# Patient Record
Sex: FEMALE | Race: WHITE | HISPANIC OR LATINO | Employment: FULL TIME | URBAN - METROPOLITAN AREA
[De-identification: names, ages, dates, MRNs, and addresses within clinical notes are randomized per-mention and may not be internally consistent; named-entity substitution may affect disease eponyms.]

---

## 2023-07-24 ENCOUNTER — TELEPHONE (OUTPATIENT)
Dept: PRIMARY CARE | Facility: CLINIC | Age: 58
End: 2023-07-24
Payer: COMMERCIAL

## 2023-07-24 NOTE — TELEPHONE ENCOUNTER
Talked with pt -  Covid + and symptoms  for 4 day   On the imuran and just started the paxlovid   Anemia since on it   Has helped her rheumatoid   Will hold the imuran and restart in 10 days   Will need to rest and hydration

## 2023-07-24 NOTE — TELEPHONE ENCOUNTER
Patient seen in ED yesterday and was advised that she has Covid - they prescribed Paxlovid which she is picking up today.    States she takes medication is taking medication for her rheumatoid issues and wants to know if she should continue taking it while she has COVID.     States she believes that this medication caused her to get covid because it lowers her immune system.     Would like to discuss with you    Please advise

## 2023-10-27 PROBLEM — K64.9 HEMORRHOIDS: Status: ACTIVE | Noted: 2023-10-27

## 2023-10-27 PROBLEM — F32.A DEPRESSION: Status: ACTIVE | Noted: 2023-10-27

## 2023-10-27 PROBLEM — M48.00 SPINAL STENOSIS: Status: ACTIVE | Noted: 2023-10-27

## 2023-10-27 PROBLEM — N32.81 OAB (OVERACTIVE BLADDER): Status: ACTIVE | Noted: 2023-10-27

## 2023-10-27 PROBLEM — M50.90 DISC DISORDER OF CERVICAL REGION: Status: ACTIVE | Noted: 2023-10-27

## 2023-10-27 PROBLEM — R53.83 MALAISE AND FATIGUE: Status: ACTIVE | Noted: 2023-10-27

## 2023-10-27 PROBLEM — R13.10 DYSPHAGIA: Status: ACTIVE | Noted: 2023-10-27

## 2023-10-27 PROBLEM — N91.1 AMENORRHEA, SECONDARY: Status: ACTIVE | Noted: 2023-10-27

## 2023-10-27 PROBLEM — R33.9 INCOMPLETE EMPTYING OF BLADDER: Status: ACTIVE | Noted: 2023-10-27

## 2023-10-27 PROBLEM — K31.84 GASTROPARESIS: Status: ACTIVE | Noted: 2023-10-27

## 2023-10-27 PROBLEM — R53.81 MALAISE AND FATIGUE: Status: ACTIVE | Noted: 2023-10-27

## 2023-10-27 PROBLEM — K44.9 HIATAL HERNIA: Status: ACTIVE | Noted: 2023-10-27

## 2023-10-27 PROBLEM — D13.91 ADENOMATOUS POLYPOSIS COLI: Status: ACTIVE | Noted: 2023-10-27

## 2023-10-27 PROBLEM — M81.0 OSTEOPOROSIS: Status: ACTIVE | Noted: 2023-10-27

## 2023-10-27 PROBLEM — K58.9 IRRITABLE BOWEL SYNDROME: Status: ACTIVE | Noted: 2023-10-27

## 2023-10-27 PROBLEM — R63.5 ABNORMAL WEIGHT GAIN: Status: ACTIVE | Noted: 2023-10-27

## 2023-10-27 PROBLEM — K21.9 ESOPHAGEAL REFLUX: Status: ACTIVE | Noted: 2023-10-27

## 2023-10-27 PROBLEM — R32 BLADDER INCONTINENCE: Status: ACTIVE | Noted: 2023-10-27

## 2023-10-27 PROBLEM — K59.09 CHRONIC CONSTIPATION: Status: ACTIVE | Noted: 2023-10-27

## 2023-10-27 PROBLEM — R10.2 FEMALE PELVIC PAIN: Status: ACTIVE | Noted: 2023-10-27

## 2023-10-27 PROBLEM — N95.9 MENOPAUSAL DISORDER: Status: ACTIVE | Noted: 2023-10-27

## 2023-10-27 PROBLEM — M79.2 RADICULAR PAIN IN LEFT ARM: Status: ACTIVE | Noted: 2023-10-27

## 2023-10-27 PROBLEM — R10.9 ABDOMINAL PAIN: Status: ACTIVE | Noted: 2023-10-27

## 2023-10-27 PROBLEM — R10.32 LEFT LOWER QUADRANT PAIN: Status: ACTIVE | Noted: 2023-10-27

## 2023-10-27 PROBLEM — F17.200 TOBACCO DEPENDENCE: Status: ACTIVE | Noted: 2023-10-27

## 2023-10-27 PROBLEM — N95.1 MENOPAUSAL SYMPTOMS: Status: ACTIVE | Noted: 2023-10-27

## 2023-10-27 PROBLEM — M06.9 RHEUMATOID ARTHRITIS (MULTI): Status: ACTIVE | Noted: 2023-10-27

## 2023-10-27 PROBLEM — M35.00 SJOGRENS SYNDROME (MULTI): Status: ACTIVE | Noted: 2023-10-27

## 2023-10-27 PROBLEM — T75.3XXA MOTION SICKNESS: Status: ACTIVE | Noted: 2023-10-27

## 2023-10-27 PROBLEM — M79.7 FIBROMYALGIA: Status: ACTIVE | Noted: 2023-10-27

## 2023-10-27 PROBLEM — N39.46 MIXED STRESS AND URGE URINARY INCONTINENCE: Status: ACTIVE | Noted: 2023-10-27

## 2023-10-27 RX ORDER — AZELASTINE 1 MG/ML
SPRAY, METERED NASAL
COMMUNITY
Start: 2020-10-01

## 2023-10-27 RX ORDER — NARATRIPTAN 2.5 MG/1
2.5 TABLET ORAL
COMMUNITY
Start: 2017-02-06

## 2023-10-27 RX ORDER — LEUCOVORIN CALCIUM 5 MG/1
TABLET ORAL
COMMUNITY
Start: 2022-08-07 | End: 2024-02-08 | Stop reason: ALTCHOICE

## 2023-10-27 RX ORDER — DEXTROAMPHETAMINE SACCHARATE, AMPHETAMINE ASPARTATE, DEXTROAMPHETAMINE SULFATE AND AMPHETAMINE SULFATE 3.75; 3.75; 3.75; 3.75 MG/1; MG/1; MG/1; MG/1
TABLET ORAL
COMMUNITY
Start: 2023-04-05

## 2023-10-27 RX ORDER — TRAZODONE HYDROCHLORIDE 100 MG/1
100 TABLET ORAL NIGHTLY
COMMUNITY
End: 2024-04-16 | Stop reason: WASHOUT

## 2023-10-27 RX ORDER — FLUTICASONE PROPIONATE 50 MCG
2 SPRAY, SUSPENSION (ML) NASAL DAILY
COMMUNITY
Start: 2022-09-09

## 2023-10-27 RX ORDER — CYCLOSPORINE 0 G/ML
SOLUTION/ DROPS OPHTHALMIC; TOPICAL
COMMUNITY
Start: 2021-05-21 | End: 2024-04-16 | Stop reason: WASHOUT

## 2023-10-27 RX ORDER — DICLOFENAC EPOLAMINE 0.01 G/1
SYSTEM TOPICAL AS NEEDED
COMMUNITY
End: 2024-02-08 | Stop reason: ALTCHOICE

## 2023-10-27 RX ORDER — LIFITEGRAST 50 MG/ML
1 SOLUTION/ DROPS OPHTHALMIC 2 TIMES DAILY
COMMUNITY
End: 2024-02-29 | Stop reason: ALTCHOICE

## 2023-10-27 RX ORDER — AZATHIOPRINE 50 MG/1
50 TABLET ORAL 2 TIMES DAILY
COMMUNITY

## 2023-10-27 RX ORDER — TROSPIUM CHLORIDE 20 MG/1
1 TABLET, FILM COATED ORAL 2 TIMES DAILY
COMMUNITY
Start: 2023-01-26 | End: 2023-10-30 | Stop reason: ALTCHOICE

## 2023-10-27 RX ORDER — DEXLANSOPRAZOLE 60 MG/1
60 CAPSULE, DELAYED RELEASE ORAL
COMMUNITY
Start: 2021-05-21 | End: 2023-10-30

## 2023-10-27 RX ORDER — POLYETHYLENE GLYCOL 3350 17 G/17G
17 POWDER, FOR SOLUTION ORAL
COMMUNITY
Start: 2019-03-26 | End: 2024-04-16 | Stop reason: WASHOUT

## 2023-10-27 RX ORDER — ALBUTEROL SULFATE 90 UG/1
1-2 AEROSOL, METERED RESPIRATORY (INHALATION) AS NEEDED
COMMUNITY
Start: 2021-12-06 | End: 2024-02-29 | Stop reason: ALTCHOICE

## 2023-10-27 RX ORDER — SODIUM FLUORIDE 5 MG/G
GEL, DENTIFRICE DENTAL
COMMUNITY
Start: 2021-12-27 | End: 2024-04-16 | Stop reason: WASHOUT

## 2023-10-27 RX ORDER — TRAMADOL HYDROCHLORIDE 50 MG/1
TABLET ORAL
COMMUNITY
Start: 2012-12-12

## 2023-10-27 RX ORDER — CYCLOSPORINE 0.5 MG/ML
1 EMULSION OPHTHALMIC EVERY 12 HOURS
COMMUNITY
End: 2024-02-08 | Stop reason: ALTCHOICE

## 2023-10-27 RX ORDER — MINOXIDIL 2.5 MG/1
0.5 TABLET ORAL DAILY
COMMUNITY
End: 2024-02-29 | Stop reason: ALTCHOICE

## 2023-10-27 RX ORDER — KETOCONAZOLE 20 MG/ML
SHAMPOO, SUSPENSION TOPICAL
COMMUNITY
Start: 2020-12-10

## 2023-10-27 RX ORDER — CARISOPRODOL 350 MG/1
TABLET ORAL 3 TIMES DAILY
COMMUNITY
End: 2024-02-08 | Stop reason: ALTCHOICE

## 2023-10-27 RX ORDER — FINASTERIDE 5 MG/1
5 TABLET, FILM COATED ORAL DAILY
COMMUNITY
Start: 2023-03-19 | End: 2024-02-29 | Stop reason: ALTCHOICE

## 2023-10-27 RX ORDER — GABAPENTIN 300 MG/1
600 CAPSULE ORAL EVERY 8 HOURS
COMMUNITY

## 2023-10-27 RX ORDER — DICLOFENAC SODIUM 75 MG/1
TABLET, DELAYED RELEASE ORAL
COMMUNITY
Start: 2017-03-06 | End: 2024-02-08 | Stop reason: ALTCHOICE

## 2023-10-27 RX ORDER — FREMANEZUMAB-VFRM 225 MG/1.5ML
INJECTION SUBCUTANEOUS
COMMUNITY
Start: 2021-03-25

## 2023-10-27 RX ORDER — PILOCARPINE HYDROCHLORIDE 7.5 MG/1
7.5 TABLET, FILM COATED ORAL 4 TIMES DAILY
COMMUNITY

## 2023-10-27 RX ORDER — MIRABEGRON 25 MG/1
1 TABLET, FILM COATED, EXTENDED RELEASE ORAL DAILY
COMMUNITY
Start: 2023-01-06 | End: 2023-10-30

## 2023-10-27 RX ORDER — DEXTROAMPHETAMINE SACCHARATE, AMPHETAMINE ASPARTATE, DEXTROAMPHETAMINE SULFATE AND AMPHETAMINE SULFATE 5; 5; 5; 5 MG/1; MG/1; MG/1; MG/1
TABLET ORAL
COMMUNITY
Start: 2022-07-10 | End: 2023-10-30 | Stop reason: ALTCHOICE

## 2023-10-27 RX ORDER — OMEPRAZOLE 40 MG/1
40 CAPSULE, DELAYED RELEASE ORAL DAILY
COMMUNITY
End: 2023-10-30

## 2023-10-27 RX ORDER — METOCLOPRAMIDE 5 MG/1
TABLET ORAL
COMMUNITY
Start: 2021-11-21 | End: 2023-10-30

## 2023-10-27 RX ORDER — DEXTROAMPHETAMINE SACCHARATE, AMPHETAMINE ASPARTATE MONOHYDRATE, DEXTROAMPHETAMINE SULFATE AND AMPHETAMINE SULFATE 2.5; 2.5; 2.5; 2.5 MG/1; MG/1; MG/1; MG/1
CAPSULE, EXTENDED RELEASE ORAL
COMMUNITY
Start: 2018-12-05 | End: 2023-10-30 | Stop reason: ALTCHOICE

## 2023-10-27 RX ORDER — FAMOTIDINE 40 MG/1
1 TABLET, FILM COATED ORAL 2 TIMES DAILY
COMMUNITY
Start: 2022-12-13

## 2023-10-27 RX ORDER — SUCRALFATE 1 G/1
1 TABLET ORAL
COMMUNITY
Start: 2021-01-24 | End: 2023-10-30

## 2023-10-27 RX ORDER — VIBEGRON 75 MG/1
1 TABLET, FILM COATED ORAL DAILY
COMMUNITY
Start: 2023-01-23 | End: 2023-10-30 | Stop reason: ALTCHOICE

## 2023-10-27 RX ORDER — BROMPHENIRAMINE MALEATE, PSEUDOEPHEDRINE HYDROCHLORIDE, AND DEXTROMETHORPHAN HYDROBROMIDE 2; 30; 10 MG/5ML; MG/5ML; MG/5ML
SYRUP ORAL
COMMUNITY
Start: 2022-08-31 | End: 2024-02-08 | Stop reason: ALTCHOICE

## 2023-10-27 RX ORDER — HYDROXYCHLOROQUINE SULFATE 200 MG/1
1 TABLET, FILM COATED ORAL DAILY
COMMUNITY
Start: 2018-12-05

## 2023-10-27 RX ORDER — CEFUROXIME AXETIL 250 MG/1
TABLET ORAL
COMMUNITY
Start: 2023-07-23 | End: 2023-10-30 | Stop reason: ALTCHOICE

## 2023-10-27 RX ORDER — ERGOCALCIFEROL 1.25 MG/1
CAPSULE ORAL
COMMUNITY
Start: 2020-09-22

## 2023-10-27 RX ORDER — METHOTREXATE 2.5 MG/1
6 TABLET ORAL
COMMUNITY
Start: 2022-12-27 | End: 2023-10-30

## 2023-10-27 RX ORDER — SERTRALINE HYDROCHLORIDE 100 MG/1
TABLET, FILM COATED ORAL
COMMUNITY
Start: 2022-09-17 | End: 2023-10-30

## 2023-10-27 RX ORDER — NAPROXEN 500 MG/1
500 TABLET ORAL 2 TIMES DAILY PRN
COMMUNITY
End: 2024-02-29 | Stop reason: ALTCHOICE

## 2023-10-29 NOTE — PROGRESS NOTES
Subjective   Patient ID: Gema High is a 58 y.o. female who presents for her annual physical and evaluation for abdominal pain and bloating       She is vaping instead of smoking.    She has gained weight. She does not eat any more than usual.  She is not exercising   She is thinking about trying the injectable weight loss medications   She has joint pains with the increased weight.    She complains of abdominal pain and bloating   When she sits her pants are uncomfortable around her abdomen  She has an appt with GI in 11/2023  She has been doing the FODMAP diet for 10 years     She has little control of her bladder     She has an appt with Gyn in 2/2024    She complains of being fatigued. Her  has a hard time waking her up from sleep.    She just started Pristiq a week ago. She has nausea with Pristiq   She complains of foul odor to her urine     She is compliant with her systemic medications     HEALTH:  PAP 2/15 - Q 5 with Dr Wei --> she declines Pap 10/2022.    Mammo 2/17 -, 2/19-, 3/2022 at [facility] was focal asymmetry right breast and follow up was negative, ordered 10/2023  BD 6/2022 -2.2 L/S and hip -2.0 at [facility]  COLON 2015, 4/2019 showed hyperplastic polyp, 6/2022 and Q 5   EKG 12/09, 12/18, 3/2022  Ca cardiac score 2/19 Zero   Urine 12/18   Flu 2012, 10/2021, 12/2022, 10/2023 and yearly at work  TDAP 2004, 12/18  Prevnar never  Pneumovax never   Zostavax never   Shingrix recommended and will check coverage   Pfizer CVD vaccine 3/26/2021 and 4/16/2021 booster 10/8/2022 and 1/9/2022   Opth- She sees regularly for dry eyes/ Sjogren's. No glaucoma, MD or HCQ toxicity   She is using Restasis OU BID and Cequa drops        Review of Systems  All systems negative except those listed in the HPI      Past Medical, Surgical, and Family History reviewed and updated in chart.  Reviewed all medications by prescribing practitioner or clinical pharmacist   (such as prescriptions, OTCs, herbal therapies and  supplements) and documented in the medical record      Objective   Visit Vitals  /78   Pulse 86   Temp 36.5 °C (97.7 °F)    Body mass index is 30.36 kg/m².     Physical Exam  Vitals reviewed.   Constitutional:       Appearance: Normal appearance.   HENT:      Head: Normocephalic.      Right Ear: Tympanic membrane, ear canal and external ear normal.      Left Ear: Tympanic membrane, ear canal and external ear normal.      Nose: Nose normal.      Mouth/Throat:      Pharynx: Oropharynx is clear.   Eyes:      Conjunctiva/sclera: Conjunctivae normal.   Cardiovascular:      Rate and Rhythm: Normal rate and regular rhythm.      Pulses: Normal pulses.      Heart sounds: Normal heart sounds.   Pulmonary:      Effort: Pulmonary effort is normal.      Breath sounds: Normal breath sounds.   Abdominal:      General: Bowel sounds are normal.      Palpations: Abdomen is soft.   Musculoskeletal:         General: Normal range of motion.      Cervical back: Normal range of motion and neck supple.      Comments: she has swan deformity at base of thumb at surgical site   tenderness to the shoulders, wrists, knees and ankles    Skin:     General: Skin is warm.   Neurological:      General: No focal deficit present.      Mental Status: She is alert and oriented to person, place, and time.   Psychiatric:         Mood and Affect: Mood normal.         Behavior: Behavior normal.         Thought Content: Thought content normal.         Judgment: Judgment normal.       Assessment/Plan   Problem List Items Addressed This Visit       Abdominal pain    Chronic constipation    Depression    Fibromyalgia    Gastroparesis    Malaise and fatigue    Osteoporosis    Rheumatoid arthritis (CMS/HCC)    Sjogrens syndrome (CMS/HCC)    Spinal stenosis    Tobacco dependence     Other Visit Diagnoses       Healthcare maintenance    -  Primary    Relevant Orders    CBC and Auto Differential    Comprehensive Metabolic Panel    Lipid Panel    TSH with  reflex to Free T4 if abnormal    Hemoglobin A1C    Weight gain        Visit for screening mammogram        Relevant Orders    BI mammo bilateral screening tomosynthesis            Annual physical completed  Annual labs ordered   Medication reconciliation performed with patient     Health Maintenence completed  -  Discussed healthy diet and regular exercise.    -  Physical exam overall unremarkable. Immunizations reviewed and updated accordingly. Healthy lifestyle choices discussed (tobacco avoidance, appropriate alcohol use, avoidance of illicit substances).   -  Patient is wearing seatbelt.   -  Screening lab work ordered as indicated.    -  Age appropriate screening tests reviewed with patient.       Seen in the ED in 7/2023 for COVID: Resolved and no residual Sx   Rx given for Ceftin 250 mg and local care discussed     RUL pneumonia 9/2022: Treated and resolved    CXR 9/9/2022 was negative for acute process   CXR 9/27/2022 RUL infiltrate with small effusion      She stopped smoking in 1/2023. She is vaping nicotine.   She started smoking at age 15. Her father smoked also.   Nicotine dependence I spent more than 3 minutes counseling patient about the need for smoking cessation and how I can support efforts when patient is ready to quit. Discussed nicotine replacement therapy, Varenicline, Bupropion, hypnosis, support groups and acupuncture as potential options. Patient currently has no signs or symptoms of tobacco related disease.  (Dx code F17.2 or Z87.891)(Billing code 89215 or 89113)      Her weight is 171 pounds with BMI at 30.36. We spent 15 minutes discussing diet and exercise. The struggle of weight loss persists   She has gained 30 pounds since 3/2023  She has been doing the FODMAP diet for 10 years    Discussed systemic medications that could be contributing to her weight gain   She has been getting steroid injections to her foot and that is contributing to her weight gain.      BP in normal range in  office. We will continue to monitor   EKG was normal 3/2022 at  preop hand surgery   Recommend she monitor her BP periodically and call with elevated readings      I have spent 15 min face to face with this patient discussing their cardiac risk and behavioral therapies of nutrition choices and exercise. We are trying to eliminate habits that are contributing to their cardiac risk.  We agreed on a plan of how they can reduce their current CV risk   The patient's 10 yr CV risk was estimated at 3.4 % 10/2022      Elevated lipids: Labs ordered and we will adjust if indicated  10/2023  Explained goal for LDL to be less than 100 and ideally less than 70   CT Cardiac Score 2/19 Zero   Discussed diet and exercise to better control lipids      Thyroid Bx in 9/2019 showed benign nodule: Labs ordered and we will adjust if indicated 10/2023  Thyroid Bx 2022 negative   She is following with Dr Sousa in endocrinology      Migraines -   Continue Tramadol 50 mg daily and pilocarpine 7.5 mg daily   Continue naratriptan 2.5 mg prn onset of HA and Ajovy as directed      Right submandibular gland tenderness radiating to the right ear -   The Sjogren dries the nasal passages a lot and they are raw.   Has been evaluated by allergist with all the left side face issues and nothing found     GERD -   She has a history of HH  Continue Pepcid 40 mg daily   She had no improvement with Dexilant   Avoid Reglan while taking Pristiq   She has been on Carafate and Prilosec in the past   Xray Upper GI 6/2021 showed mild to moderate tertiary contractions in the mid to distal esophagus resulting in moderate gastroesophageal reflux. Delay in passage of contrast stomach duodenum, may represent sequela of gastro paresis     Depression is all from her stressors and pain: Recommend she talk with psych about increasing the Adderall to 20 mg daily to counteract her weight gain  Continue Adderall 15 mg daily, Trazodone 100 mg nightly and Pristiq 25 mg  daily   She is seeing psych and they are doing refills, contracts and drug screen.   Discussed food satiation with SSRIs. She will be mindful of this     OAB:  Previously failed Trospium due to experiencing adverse side effects and both Gemtesa and Myrbetriq were cost-prohibitive.   - Cystoscopy + intradetrusor Botox injection of 100 U @ 4 sites 5/2023. Tolerated the procedure well with normal cystoscopy findings: Normal bladder mucosa. No stones, masses, or tumors seen.   She saw Dr. Viktoriya Fuentes in 5/2023      IBS and Constipation - She complains of abdominal pain and bloating. When she sits her pants are uncomfortable around her abdomen. Explained the weight gain is contributing to her abdominal discomfort. She will talk with GI about using Wegovy.  She is doing a FODMAP diet   Hemorrhoids were removed with Dr Toney nonsurgically.  Continue Linzess 145 mg a day. This helps the constipation but not the bloating   Colon 6/2022 and Q 5   She has an appt with GI in 11/2023      Seronegative RA and Sjogrens - tenderness to the shoulders, wrists, knees and ankles 10/2023  She restarted MTX in 2022 due to her arthritis being so bad   The main cause of her fatigue, muscle pain, GI issues and insomnia is Fibromyalgia   She takes trazodone for the sleep issues.   Coldness and tingling of hands - Worse right hand. This could be nerve impingement.   Continue Imuran 50 mg BID   Continue  mg daily and pilocarpine 7.5 mg daily  Continue gabapentin 600 mg 2 tablets Q 8 hours   She is following with [facility]ology at [facility]   Recommend she look into somatic tracking      Cervical disc d/o:  She failed pain management and the EMG do not show any radiculopathy  No surgery for her hands despite the numbness, pain and coldness   some atrophy right thenar region so concerned there is some innervation in the hand      Mild spinal scoliosis -  She sees pain Management and plans to get nerve blocks soon   She had C spine fusion  and some severe nerve pain in her neck and jaw and the X-rays all negative.     Rotary cuff tear surgery 11/8/17. She still has a lot of pain in the left shoulder.   This affects the numbness and pain down the left arm when working      Bunion on right foot and arthritis outer left foot   Avoid walking barefooted and wear good shoes   She is following with podiatry and last visit 10/2023     She had thumb replacement right hand in 3/2022 : she has swan deformity at base of thumb at surgical site   She is going to do the left thumb in 11/2022      PAP 2/15 - and Q 5 with Dr. Wei. She saw Dr Wei in 11/2019. she declines Pap 10/2022. She has an appt with Gyn in 2/2024   She saw Dr Farr in 2021 but did not have a Pap    FSH 44.4 on labs in 7/2022  US of pelvis in 3/2020 showed small uterine fibroid, thickened endometrium   Pelvic wall therapy was helpful.   Mammo 3/2022 at [facility] was focal asymmetry right breast and follow up was negative.  Orders placed for mammogram 10/2023  Breast exam normal 10/2023    BD 6/2022 -2.2 L/S and hip -2.0 at [facility]. Continue OTC Calcium 500 mg BID, OTC Vitamin D 2000 UT daily and eat 2 servings of calcium enriched foods daily. Discussed importance of weight bearing exercises   Colonoscopy 6/2022 and Q 5      Ophth  She sees regularly for dry eyes/ Sjogren's. No glaucoma, MD or HCQ toxicity. She is using Restasis OU BID and Cequa drops   She will have her next eye exam faxed to my office in order to update her medical records.      Flu 2012, 10/2021, 12/2022, 10/2023 yearly at work  TDAP 2004, 12/18  Prevnar never  Pneumovax never   Zostavax never   Shingrix recommended and will check coverage   Pfizer CVD 3/26/2021 and 4/16/2021 booster 10/8/2022 and 1/9/2022     Some elements in the chart were copied from Dr. Diaz's last office visit with patient.   Notes have been updated where appropriate, and reflect my current medical decision making from today.      Return in 4 months for follow  up or sooner if needed  (CPE due 10/2024)      Scribe Attestation  By signing my name below, I, Kimberly Eastman , Scrib[employer]   attest that this documentation has been prepared under the direction and in the presence of Mariana Diaz MD.

## 2023-10-30 ENCOUNTER — LAB (OUTPATIENT)
Dept: LAB | Facility: LAB | Age: 58
End: 2023-10-30
Payer: COMMERCIAL

## 2023-10-30 ENCOUNTER — OFFICE VISIT (OUTPATIENT)
Dept: PRIMARY CARE | Facility: CLINIC | Age: 58
End: 2023-10-30
Payer: COMMERCIAL

## 2023-10-30 VITALS
HEIGHT: 63 IN | TEMPERATURE: 97.7 F | WEIGHT: 171.4 LBS | SYSTOLIC BLOOD PRESSURE: 132 MMHG | DIASTOLIC BLOOD PRESSURE: 78 MMHG | OXYGEN SATURATION: 95 % | BODY MASS INDEX: 30.37 KG/M2 | HEART RATE: 86 BPM

## 2023-10-30 DIAGNOSIS — M35.01 SJOGREN'S SYNDROME WITH KERATOCONJUNCTIVITIS SICCA (MULTI): ICD-10-CM

## 2023-10-30 DIAGNOSIS — K59.09 CHRONIC CONSTIPATION: ICD-10-CM

## 2023-10-30 DIAGNOSIS — Z00.00 HEALTHCARE MAINTENANCE: Primary | ICD-10-CM

## 2023-10-30 DIAGNOSIS — Z12.31 VISIT FOR SCREENING MAMMOGRAM: ICD-10-CM

## 2023-10-30 DIAGNOSIS — M81.0 AGE-RELATED OSTEOPOROSIS WITHOUT CURRENT PATHOLOGICAL FRACTURE: ICD-10-CM

## 2023-10-30 DIAGNOSIS — R63.5 WEIGHT GAIN: ICD-10-CM

## 2023-10-30 DIAGNOSIS — K31.84 GASTROPARESIS: ICD-10-CM

## 2023-10-30 DIAGNOSIS — R53.81 MALAISE AND FATIGUE: ICD-10-CM

## 2023-10-30 DIAGNOSIS — R53.83 MALAISE AND FATIGUE: ICD-10-CM

## 2023-10-30 DIAGNOSIS — M79.7 FIBROMYALGIA: ICD-10-CM

## 2023-10-30 DIAGNOSIS — Z00.00 HEALTHCARE MAINTENANCE: ICD-10-CM

## 2023-10-30 DIAGNOSIS — F32.4 MAJOR DEPRESSIVE DISORDER WITH SINGLE EPISODE, IN PARTIAL REMISSION (CMS-HCC): ICD-10-CM

## 2023-10-30 DIAGNOSIS — R10.84 GENERALIZED ABDOMINAL PAIN: ICD-10-CM

## 2023-10-30 DIAGNOSIS — M05.79 RHEUMATOID ARTHRITIS INVOLVING MULTIPLE SITES WITH POSITIVE RHEUMATOID FACTOR (MULTI): ICD-10-CM

## 2023-10-30 DIAGNOSIS — F17.200 TOBACCO DEPENDENCE: ICD-10-CM

## 2023-10-30 DIAGNOSIS — M48.07 SPINAL STENOSIS OF LUMBOSACRAL REGION: ICD-10-CM

## 2023-10-30 PROBLEM — R32 BLADDER INCONTINENCE: Status: RESOLVED | Noted: 2023-10-27 | Resolved: 2023-10-30

## 2023-10-30 PROBLEM — N91.1 AMENORRHEA, SECONDARY: Status: RESOLVED | Noted: 2023-10-27 | Resolved: 2023-10-30

## 2023-10-30 PROBLEM — N95.9 MENOPAUSAL DISORDER: Status: RESOLVED | Noted: 2023-10-27 | Resolved: 2023-10-30

## 2023-10-30 LAB
ALBUMIN SERPL BCP-MCNC: 4.7 G/DL (ref 3.4–5)
ALP SERPL-CCNC: 59 U/L (ref 33–110)
ALT SERPL W P-5'-P-CCNC: 12 U/L (ref 7–45)
ANION GAP SERPL CALC-SCNC: 10 MMOL/L (ref 10–20)
AST SERPL W P-5'-P-CCNC: 16 U/L (ref 9–39)
BASOPHILS # BLD AUTO: 0.04 X10*3/UL (ref 0–0.1)
BASOPHILS NFR BLD AUTO: 0.8 %
BILIRUB SERPL-MCNC: 0.5 MG/DL (ref 0–1.2)
BUN SERPL-MCNC: 12 MG/DL (ref 6–23)
CALCIUM SERPL-MCNC: 9.8 MG/DL (ref 8.6–10.3)
CHLORIDE SERPL-SCNC: 105 MMOL/L (ref 98–107)
CHOLEST SERPL-MCNC: 250 MG/DL (ref 0–199)
CHOLESTEROL/HDL RATIO: 3.8
CO2 SERPL-SCNC: 29 MMOL/L (ref 21–32)
CREAT SERPL-MCNC: 0.66 MG/DL (ref 0.5–1.05)
EOSINOPHIL # BLD AUTO: 0.03 X10*3/UL (ref 0–0.7)
EOSINOPHIL NFR BLD AUTO: 0.6 %
ERYTHROCYTE [DISTWIDTH] IN BLOOD BY AUTOMATED COUNT: 13.1 % (ref 11.5–14.5)
GFR SERPL CREATININE-BSD FRML MDRD: >90 ML/MIN/1.73M*2
GLUCOSE SERPL-MCNC: 106 MG/DL (ref 74–99)
HCT VFR BLD AUTO: 37 % (ref 36–46)
HDLC SERPL-MCNC: 66.1 MG/DL
HGB BLD-MCNC: 12.2 G/DL (ref 12–16)
IMM GRANULOCYTES # BLD AUTO: 0.01 X10*3/UL (ref 0–0.7)
IMM GRANULOCYTES NFR BLD AUTO: 0.2 % (ref 0–0.9)
LDLC SERPL CALC-MCNC: 170 MG/DL
LYMPHOCYTES # BLD AUTO: 0.67 X10*3/UL (ref 1.2–4.8)
LYMPHOCYTES NFR BLD AUTO: 12.8 %
MCH RBC QN AUTO: 31 PG (ref 26–34)
MCHC RBC AUTO-ENTMCNC: 33 G/DL (ref 32–36)
MCV RBC AUTO: 94 FL (ref 80–100)
MONOCYTES # BLD AUTO: 0.17 X10*3/UL (ref 0.1–1)
MONOCYTES NFR BLD AUTO: 3.3 %
NEUTROPHILS # BLD AUTO: 4.3 X10*3/UL (ref 1.2–7.7)
NEUTROPHILS NFR BLD AUTO: 82.3 %
NON HDL CHOLESTEROL: 184 MG/DL (ref 0–149)
NRBC BLD-RTO: 0 /100 WBCS (ref 0–0)
PLATELET # BLD AUTO: 306 X10*3/UL (ref 150–450)
PMV BLD AUTO: 10.1 FL (ref 7.5–11.5)
POTASSIUM SERPL-SCNC: 4.2 MMOL/L (ref 3.5–5.3)
PROT SERPL-MCNC: 7.1 G/DL (ref 6.4–8.2)
RBC # BLD AUTO: 3.93 X10*6/UL (ref 4–5.2)
SODIUM SERPL-SCNC: 140 MMOL/L (ref 136–145)
TRIGL SERPL-MCNC: 72 MG/DL (ref 0–149)
TSH SERPL-ACNC: 1.15 MIU/L (ref 0.44–3.98)
VLDL: 14 MG/DL (ref 0–40)
WBC # BLD AUTO: 5.2 X10*3/UL (ref 4.4–11.3)

## 2023-10-30 PROCEDURE — 84443 ASSAY THYROID STIM HORMONE: CPT

## 2023-10-30 PROCEDURE — 99406 BEHAV CHNG SMOKING 3-10 MIN: CPT | Performed by: INTERNAL MEDICINE

## 2023-10-30 PROCEDURE — 1036F TOBACCO NON-USER: CPT | Performed by: INTERNAL MEDICINE

## 2023-10-30 PROCEDURE — 36415 COLL VENOUS BLD VENIPUNCTURE: CPT

## 2023-10-30 PROCEDURE — 85025 COMPLETE CBC W/AUTO DIFF WBC: CPT

## 2023-10-30 PROCEDURE — 80061 LIPID PANEL: CPT

## 2023-10-30 PROCEDURE — 80053 COMPREHEN METABOLIC PANEL: CPT

## 2023-10-30 PROCEDURE — 83036 HEMOGLOBIN GLYCOSYLATED A1C: CPT

## 2023-10-30 PROCEDURE — 99396 PREV VISIT EST AGE 40-64: CPT | Performed by: INTERNAL MEDICINE

## 2023-10-30 RX ORDER — IPRATROPIUM BROMIDE 21 UG/1
1 SPRAY, METERED NASAL 3 TIMES DAILY
COMMUNITY
End: 2024-02-29 | Stop reason: ALTCHOICE

## 2023-10-30 RX ORDER — IBUPROFEN 600 MG/1
600 TABLET ORAL EVERY 8 HOURS
COMMUNITY
Start: 2023-07-24 | End: 2024-02-29 | Stop reason: ALTCHOICE

## 2023-10-30 RX ORDER — MELOXICAM 15 MG/1
7.5 TABLET ORAL DAILY
COMMUNITY

## 2023-10-30 RX ORDER — DESVENLAFAXINE SUCCINATE 25 MG/1
25 TABLET, EXTENDED RELEASE ORAL DAILY
COMMUNITY
End: 2024-04-16 | Stop reason: WASHOUT

## 2023-10-30 ASSESSMENT — ENCOUNTER SYMPTOMS
LOSS OF SENSATION IN FEET: 0
DEPRESSION: 0
OCCASIONAL FEELINGS OF UNSTEADINESS: 0

## 2023-10-30 ASSESSMENT — PATIENT HEALTH QUESTIONNAIRE - PHQ9
SUM OF ALL RESPONSES TO PHQ9 QUESTIONS 1 AND 2: 0
2. FEELING DOWN, DEPRESSED OR HOPELESS: NOT AT ALL
1. LITTLE INTEREST OR PLEASURE IN DOING THINGS: NOT AT ALL

## 2023-10-31 LAB
EST. AVERAGE GLUCOSE BLD GHB EST-MCNC: 108 MG/DL
HBA1C MFR BLD: 5.4 %

## 2023-12-18 ENCOUNTER — TELEPHONE (OUTPATIENT)
Dept: PRIMARY CARE | Facility: CLINIC | Age: 58
End: 2023-12-18
Payer: COMMERCIAL

## 2023-12-18 RX ORDER — SEMAGLUTIDE 0.25 MG/.5ML
0.25 INJECTION, SOLUTION SUBCUTANEOUS
Qty: 2 ML | Refills: 1 | Status: SHIPPED | OUTPATIENT
Start: 2023-12-18 | End: 2023-12-29

## 2023-12-18 RX ORDER — SEMAGLUTIDE 0.25 MG/.5ML
0.25 INJECTION, SOLUTION SUBCUTANEOUS
Qty: 2 ML | Refills: 0 | Status: SHIPPED | OUTPATIENT
Start: 2023-12-18 | End: 2023-12-18

## 2023-12-18 NOTE — TELEPHONE ENCOUNTER
She states she called over a week ago to let you know the gastroenterologist  approves the wevy and she wanted you to call in wegovy for her so she can start it. Please call and advise

## 2023-12-19 RX ORDER — LIRAGLUTIDE 6 MG/ML
0.6 INJECTION, SOLUTION SUBCUTANEOUS DAILY
Qty: 3 ML | Refills: 0 | Status: SHIPPED | OUTPATIENT
Start: 2023-12-19 | End: 2024-01-17

## 2023-12-19 NOTE — TELEPHONE ENCOUNTER
Due to needing a prior authorization, we needed to call in Saxenda instead, we are awaiting to see if that is covered or not.

## 2023-12-29 RX ORDER — SEMAGLUTIDE 0.25 MG/.5ML
0.25 INJECTION, SOLUTION SUBCUTANEOUS
Qty: 2 ML | Refills: 1 | Status: SHIPPED | OUTPATIENT
Start: 2023-12-29 | End: 2024-02-08

## 2024-01-09 ENCOUNTER — TELEPHONE (OUTPATIENT)
Dept: PRIMARY CARE | Facility: CLINIC | Age: 59
End: 2024-01-09
Payer: COMMERCIAL

## 2024-01-09 RX ORDER — LIRAGLUTIDE 6 MG/ML
0.6 INJECTION, SOLUTION SUBCUTANEOUS DAILY
Qty: 3 ML | Refills: 0 | Status: CANCELLED | OUTPATIENT
Start: 2024-01-09 | End: 2024-02-08

## 2024-01-09 NOTE — TELEPHONE ENCOUNTER
Patient received liraglutide and this came with no needles. I am unsure what size would be called in for this medication.

## 2024-01-17 RX ORDER — LIRAGLUTIDE 6 MG/ML
0.6 INJECTION, SOLUTION SUBCUTANEOUS DAILY
Qty: 3 ML | Refills: 0 | Status: SHIPPED | OUTPATIENT
Start: 2024-01-17 | End: 2024-02-29 | Stop reason: SDUPTHER

## 2024-02-05 ENCOUNTER — OFFICE VISIT (OUTPATIENT)
Dept: NEUROLOGY | Facility: CLINIC | Age: 59
End: 2024-02-05
Payer: COMMERCIAL

## 2024-02-05 VITALS
HEIGHT: 63 IN | TEMPERATURE: 96.8 F | DIASTOLIC BLOOD PRESSURE: 72 MMHG | SYSTOLIC BLOOD PRESSURE: 114 MMHG | WEIGHT: 176.8 LBS | RESPIRATION RATE: 16 BRPM | HEART RATE: 95 BPM | BODY MASS INDEX: 31.33 KG/M2

## 2024-02-05 DIAGNOSIS — R20.2 PARESTHESIAS: ICD-10-CM

## 2024-02-05 DIAGNOSIS — M35.00 SJOGREN'S SYNDROME, WITH UNSPECIFIED ORGAN INVOLVEMENT (MULTI): ICD-10-CM

## 2024-02-05 DIAGNOSIS — M06.09 RHEUMATOID ARTHRITIS OF MULTIPLE SITES WITH NEGATIVE RHEUMATOID FACTOR (MULTI): ICD-10-CM

## 2024-02-05 DIAGNOSIS — M54.16 LUMBAR RADICULOPATHY: ICD-10-CM

## 2024-02-05 DIAGNOSIS — M79.672 FOOT PAIN, BILATERAL: Primary | ICD-10-CM

## 2024-02-05 DIAGNOSIS — M47.816 LUMBAR SPONDYLOSIS: ICD-10-CM

## 2024-02-05 DIAGNOSIS — M79.671 FOOT PAIN, BILATERAL: Primary | ICD-10-CM

## 2024-02-05 PROCEDURE — 1036F TOBACCO NON-USER: CPT | Performed by: PSYCHIATRY & NEUROLOGY

## 2024-02-05 PROCEDURE — 3008F BODY MASS INDEX DOCD: CPT | Performed by: PSYCHIATRY & NEUROLOGY

## 2024-02-05 PROCEDURE — 99205 OFFICE O/P NEW HI 60 MIN: CPT | Performed by: PSYCHIATRY & NEUROLOGY

## 2024-02-05 ASSESSMENT — ENCOUNTER SYMPTOMS
DEPRESSION: 0
OCCASIONAL FEELINGS OF UNSTEADINESS: 0
LOSS OF SENSATION IN FEET: 0

## 2024-02-05 NOTE — PROGRESS NOTES
Subjective     Gema Zuletae 58 y.o.  HPI  The patient states that she began to have bilateral foot pain about 5 years ago. She feels that the pain has gotten progressively worse.  The patient states that the pain started on the balls of her feet bilaterally.  When she stands for long periods of time the pain gets significantly worse.  The patient rates the pain in the balls of her feet at about an 8 out of 10 in severity.  She states that when she is off of her feet that the pain improves.  The patient states that she developed burning pain on the sides of her feet bilaterally and it started about 3 years ago.  She rates this pain at approximately a 9 out of 10 in severity.  She states that contact with anything will make the burning worse.  She states that when she wear shoes the pain is excruciatingly bad.  The patient does have some back pain is about 4 out of 10 in severity.  The patient did see podiatry who performed x-rays as well as injections.  I do not have a copy of the x-ray results or the podiatry visit.  The patient did have an x-ray of the lumbar spine in 2020 that showed mild disc disease and transitional anatomy with a sacralized L5 vertebral body.  The patient had an EMG nerve conduction study of the lower extremity several years ago at [facility] and the patient states that she was diagnosed with neuropathy at that time.  I do not have a copy that result.    The patient is compliant with Ajovy, Imuran, Pristiq, gabapentin, Plaquenil, Mobic, trazodone and Adderall.    Review of Systems   Neurological:         Bilateral foot pain        Patient Active Problem List   Diagnosis    Abdominal pain    Depression    Disc disorder of cervical region    Dysphagia    Esophageal reflux    Hiatal hernia    Adenomatous polyposis coli    Female pelvic pain    Fibromyalgia    Gastroparesis    Hemorrhoids    Incomplete emptying of bladder    Chronic constipation    Irritable bowel syndrome    Left lower  quadrant pain    Malaise and fatigue    Menopausal symptoms    Mixed stress and urge urinary incontinence    Motion sickness    OAB (overactive bladder)    Osteoporosis    Radicular pain in left arm    Rheumatoid arthritis (CMS/HCC)    Sjogrens syndrome (CMS/HCC)    Spinal stenosis    Tobacco dependence        Past Medical History:   Diagnosis Date    Acute pharyngitis, unspecified 10/21/2014    Sore throat    Anxiety disorder, unspecified     Anxiety and depression    Chronic rhinitis 09/08/2015    Rhinitis    Furuncle of buttock 09/06/2017    Boil, buttock    Major depressive disorder, recurrent, unspecified (CMS/HCC)     Major depression, recurrent    Other malaise 07/09/2020    Malaise and fatigue    Personal history of diseases of the skin and subcutaneous tissue     History of psoriasis    Personal history of other diseases of the digestive system 11/24/2014    History of rectal bleeding    Personal history of other diseases of the musculoskeletal system and connective tissue     History of arthritis    Personal history of other diseases of the respiratory system 12/06/2021    History of acute bronchitis with bronchospasm    Pneumonia, unspecified organism 10/06/2022    Pneumonia of right lung due to infectious organism, unspecified part of lung        Past Surgical History:   Procedure Laterality Date    BREAST SURGERY  01/04/2013    Breast Surgery Reduction Procedure Bilateral    COLONOSCOPY W/ POLYPECTOMY  01/04/2013    Complete Colonoscopy For Polyp Removal    ESOPHAGOGASTRODUODENOSCOPY  01/04/2013    Diagnostic Esophagogastroduodenoscopy    HYSTEROSCOPY  02/19/2015    Hysteroscopy With Endometrial Ablation    OTHER SURGICAL HISTORY  01/04/2013    Hysteroscopy    OTHER SURGICAL HISTORY  01/04/2013    Spinal Arthrodesis For Deformity By Anterior Approach    OTHER SURGICAL HISTORY  01/04/2013    Fusion / Refusion Of Vertebrae    TUBAL LIGATION  01/04/2013    Tubal Ligation        Social History      Socioeconomic History    Marital status:      Spouse name: Not on file    Number of children: Not on file    Years of education: Not on file    Highest education level: Not on file   Occupational History    Not on file   Tobacco Use    Smoking status: Former     Packs/day: 1.00     Years: 43.00     Additional pack years: 0.00     Total pack years: 43.00     Types: Cigarettes     Start date:      Quit date: 2023     Years since quittin.0     Passive exposure: Past    Smokeless tobacco: Never   Vaping Use    Vaping Use: Every day    Substances: Nicotine    Passive vaping exposure: Yes   Substance and Sexual Activity    Alcohol use: Not Currently    Drug use: Never    Sexual activity: Not on file   Other Topics Concern    Not on file   Social History Narrative    Not on file     Social Determinants of Health     Financial Resource Strain: Not on file   Food Insecurity: Not on file   Transportation Needs: Not on file   Physical Activity: Not on file   Stress: Not on file   Social Connections: Not on file   Intimate Partner Violence: Not on file   Housing Stability: Not on file        Family History   Problem Relation Name Age of Onset    Diabetes Mother      Epilepsy Mother          and recurrent seizures    Hypertension Mother          essential htn    Other (Bronchitis) Mother      Pancreatic cancer Mother      Diabetes Father      Other (cardiac disorder) Father      Other (stroke syndrome) Father      Other (bronchitis) Daughter          Current Outpatient Medications   Medication Instructions    Ajovy Autoinjector 225 mg/1.5 mL auto-injector Ajovy 225 MG/1.5ML Subcutaneous Solution Auto-injector  Quantity: 2   Refills: 0  Start: 25-Mar-2021    albuterol 90 mcg/actuation inhaler 1-2 puffs, inhalation, As needed    amphetamine-dextroamphetamine (Adderall) 15 mg tablet Amphetamine-Dextroamphetamine 15 MG Oral Tablet  Quantity: 30   Refills: 0  Start: 2023    azaTHIOprine (IMURAN) 50 mg,  oral, 2 times daily    azelastine (Astelin) 137 mcg (0.1 %) nasal spray Azelastine HCl - 0.1 % Nasal Solution  Quantity: 30   Refills: 0  Start: 1-Oct-2020    brompheniramine-pseudoeph-DM 2-30-10 mg/5 mL syrup Hpkzubcbr-Kzwxdbyo-MO 30-2-10 MG/5ML Oral Syrup  Quantity: 280   Refills: 0  Start: 31-Aug-2022    carisoprodol (Soma) 350 mg tablet oral, 3 times daily    cycloSPORINE (Cequa) 0.09 % dropperette Cequa 0.09 % Ophthalmic Solution  Refills: 0 MD Mariana Diaz Start: 21-May-2021    cycloSPORINE (Restasis) 0.05 % ophthalmic emulsion 1 drop, Both Eyes, Every 12 hours    desvenlafaxine succinate (PRISTIQ) 25 mg, oral, Daily    diclofenac (Voltaren) 75 mg EC tablet Diclofenac Sodium 75 MG Oral Tablet Delayed Release  Quantity: 60   Refills: 0  Start: 6-Mar-2017    diclofenac epolamine (Flector) 1.3 % patch Topical, As needed, To affected area    ergocalciferol (Vitamin D-2) 1.25 MG (34285 UT) capsule Vitamin D (Ergocalciferol) 1.25 MG (08233 UT) Oral Capsule  Quantity: 4   Refills: 0  Start: 22-Sep-2020    finasteride (PROSCAR) 5 mg, oral, Daily    fluoride, sodium, 1.1 % gel dental, USE PEA SIZE AMOUNT TWICE A DAY DO NOT EAT OR DRINK FOR 30 MINUTES AFTER USE<BR>    fluticasone (Flonase) 50 mcg/actuation nasal spray 2 sprays, Each Nostril, Daily    fluticasone (Veramyst) 27.5 mcg/actuation nasal spray 1 spray, nasal, Daily RT    gabapentin (NEURONTIN) 600 mg, oral, Every 8 hours    hydroxychloroquine (Plaquenil) 200 mg tablet 1 tablet, oral, Daily    ibuprofen 600 mg, oral, Every 8 hours    ipratropium (Atrovent) 21 mcg (0.03 %) nasal spray 1 spray, Each Nostril, 3 times daily    ketoconazole (NIZOral) 2 % shampoo Ketoconazole 2 % External Shampoo  Quantity: 120   Refills: 0  Start: 10-Dec-2020    leucovorin (Wellcovorin) 5 mg tablet Leucovorin Calcium 5 MG Oral Tablet  Quantity: 12   Refills: 0  Start: 7-Aug-2022    lifitegrast (Xiidra) 5 % dropperette 1 drop, ophthalmic (eye), 2 times daily    linaCLOtide  "(Linzess) 145 mcg capsule 1 capsule, oral, Daily    meloxicam (MOBIC) 7.5 mg, oral, Daily    minoxidil (Loniten) 2.5 mg tablet 0.5 tablets, oral, Daily    naproxen (NAPROSYN) 500 mg, oral, 2 times daily PRN    naratriptan (AMERGE) 2.5 mg, oral, AT ONSET OF HEADACHE, MAY REPEAT ONCE IN 4 HOURS    NON FORMULARY coumpound Gabapentin and Lidocaine    Pepcid 40 mg tablet 1 tablet, oral, Nightly    pilocarpine (SALAGEN) 7.5 mg, oral, 4 times daily    polyethylene glycol (GLYCOLAX, MIRALAX) 17 g, oral, MIX 17 GM IN 8 OUNCES OF LIQUID AND DRINK 1 TO 2 TIMES DAILY FOR CONSTIPATION    Saxenda 0.6 mg, subcutaneous, Daily    traMADol (Ultram) 50 mg tablet traMADol HCl - 50 MG Oral Tablet  Quantity: 90   Refills: 0  Start: 12-Dec-2012    traZODone (DESYREL) 100 mg, oral, Nightly    Wegovy 0.25 mg, subcutaneous, Weekly        Allergies   Allergen Reactions    Adalimumab Other     Dry skin    Duloxetine Other     Depression    Escitalopram Oxalate Other     Vertigo    Esomeprazole Unknown    Fluoxetine Other     Could not wake up out of sleeping     Omeprazole Unknown    Pantoprazole Unknown    Topiramate Other     Depression    Venlafaxine Other     Rectal spasm    Wellbutrin [Bupropion Hcl] Agitation        Objective  /72 (BP Location: Right arm)   Pulse 95   Temp 36 °C (96.8 °F)   Resp 16   Ht 1.6 m (5' 3\")   Wt 80.2 kg (176 lb 12.8 oz)   BMI 31.32 kg/m²    GENERAL APPEARANCE:  No distress, alert and cooperative.      CARDIOVASCULAR: Regular, rate and rhythm, without murmur. No carotid bruits. Pulses +2 and equal in all extremities. No edema, or tenderness to palpation.  The patient has excellent capillary refills in her feet bilaterally.     MENTAL STATE:  Orientation was normal to time, place and person. Recent and remote memory was intact.  Attention span and concentration were normal. Language testing was normal for comprehension, repetition, expression, and naming. Calculation was intact. The patient could " correctly interpret a picture, and copy a diagram. General fund of knowledge was intact. Mini-mental status examination was performed with no errors.      OPHTHALMOSCOPIC: The ophthalmoscopic exam normal. The fundi were well visualized with normal disc margins, clear vessels and vascular pulsations. No disc edema. The cup/disk ratio was not enlarged. No hemorrhages or exudates were present in the posterior segments that were visualized.      CRANIAL NERVES:  Cranial nerves were normal.      CN 2- Visual Acuity  OD: 20/20 (corrected)   OS: 20/20 (corrected); visual fields full to confrontation.      CN 3, 4, 6-  Pupils round, 4 mm in diameter, equally reactive to light. No ptosis. EOMs normal alignment, full range of movement, no nystagmus     CN 5- Facial sensation intact bilaterally. Normal corneal reflexes.      CN 7- Normal and symmetric facial strength. Nasolabial folds symmetric.     CN 8- Hearing intact to finger rub, whisper.      CN 9- Palate elevates symmetrically. Normal gag reflex.      CN 11- Normal strength of shoulder shrug and neck turning      CN 12- Tongue midline, with normal bulk and strength; no fasciculations.      MOTOR:  Motor exam was normal. Muscle bulk and tone were normal in both upper and lower extremities. Muscle strength was 5/5 in distal and proximal muscles in both upper and lower extremities. No fasciculations, tremor or other abnormal movements were present.      REFLEXES:  Right/ Left:  Biceps 2/2, brachioradialis 2/2, triceps 2/2, patellar 2/2, ankle 2/2  Babinski: toes downgoing to plantar stimulation. No clonus, frontal release signs or other pathologic reflexes present.      SENSORY: Sensory exam showed decrease sensation to light touch in the left foot compared to the right.  The patient's sharp/dull, vibration and position sense was intact in both UE and LE.      COORDINATION: APRYL were intact in upper and lower extremities.  In UE- finger-nose-finger was intact and in LE-  heel-to-shin was intact without dysmetria or overshoot.       GAIT: Station was stable with a normal base and negative Romberg sign. Gait was stable with a normal arm swing and speed. No ataxia, shuffling, steppage or waddling was noted. Tandem gait was intact. Postural reflexes were normal.     Assessment/Plan   Impression: The patient is a 58-year-old female who has had chronic foot pain.  Her neurological examination is mildly abnormal and noted above.  The differential diagnosis for foot pain includes complex regional pain syndrome, tendinitis, lumbar stenosis, multiple lumbar radiculopathies and lumbosacral plexopathy.    Plan: The patient needs an MRI of the lumbar spine without contrast.  The patient needs a bone scan as well as an EMG nerve conduction study of the lower extremities bilaterally.  I would like to have a copy of the podiatry consult as well as x-rays that were done on her feet bilaterally.    I would like to have a copy of the EMG nerve conduction study that was done at [facility] several years ago for my review.  The patient should continue all of her medicines and stroke risk factor modification.  The patient can use symptomatic pain medicines for severe pain.  I discussed all these issues in detail with the patient and answered all of her questions.  The patient will follow-up with me in 6 months.

## 2024-02-05 NOTE — PATIENT INSTRUCTIONS
The patient needs an MRI of the lumbar spine without contrast.  The patient needs a bone scan as well as an EMG nerve conduction study of the lower extremities bilaterally.  I would like to have a copy of the podiatry consult as well as x-rays that were done on her feet bilaterally.    I would like to have a copy of the EMG nerve conduction study that was done at [facility] several years ago for my review.  The patient should continue all of her medicines and stroke risk factor modification.  The patient can use symptomatic pain medicines for severe pain.  I discussed all these issues in detail with the patient and answered all of her questions.  The patient will follow-up with me in 6 months.

## 2024-02-08 ENCOUNTER — OFFICE VISIT (OUTPATIENT)
Dept: OBSTETRICS AND GYNECOLOGY | Facility: CLINIC | Age: 59
End: 2024-02-08
Payer: COMMERCIAL

## 2024-02-08 VITALS
WEIGHT: 175 LBS | SYSTOLIC BLOOD PRESSURE: 116 MMHG | DIASTOLIC BLOOD PRESSURE: 64 MMHG | HEIGHT: 63 IN | BODY MASS INDEX: 31.01 KG/M2

## 2024-02-08 DIAGNOSIS — N95.8 GENITOURINARY SYNDROME OF MENOPAUSE: Primary | ICD-10-CM

## 2024-02-08 DIAGNOSIS — Z12.31 BREAST CANCER SCREENING BY MAMMOGRAM: ICD-10-CM

## 2024-02-08 DIAGNOSIS — Z01.419 WELL WOMAN EXAM: ICD-10-CM

## 2024-02-08 DIAGNOSIS — Z12.4 CERVICAL CANCER SCREENING: ICD-10-CM

## 2024-02-08 PROCEDURE — 1036F TOBACCO NON-USER: CPT | Performed by: OBSTETRICS & GYNECOLOGY

## 2024-02-08 PROCEDURE — 99396 PREV VISIT EST AGE 40-64: CPT | Performed by: OBSTETRICS & GYNECOLOGY

## 2024-02-08 PROCEDURE — 88175 CYTOPATH C/V AUTO FLUID REDO: CPT

## 2024-02-08 PROCEDURE — 3008F BODY MASS INDEX DOCD: CPT | Performed by: OBSTETRICS & GYNECOLOGY

## 2024-02-08 PROCEDURE — 87624 HPV HI-RISK TYP POOLED RSLT: CPT

## 2024-02-08 RX ORDER — ESTRADIOL 0.1 MG/G
CREAM VAGINAL
Qty: 1 G | Refills: 1 | Status: SHIPPED | OUTPATIENT
Start: 2024-02-08 | End: 2024-04-16 | Stop reason: WASHOUT

## 2024-02-08 ASSESSMENT — ENCOUNTER SYMPTOMS
DYSURIA: 1
FATIGUE: 1
DYSPHORIC MOOD: 1
UNEXPECTED WEIGHT CHANGE: 1

## 2024-02-08 NOTE — ASSESSMENT & PLAN NOTE
Thank you for your visit for well woman care.  A pap smear and HPV test was performed today. The results will be in MyChart in about three weeks.  You are due to have a mammogram performed. Please schedule.    We discussed your concerns about menopause and your physical and mental health. You do not want to try hormone therapy.  We can try some vaginal estrogen for your urinary symptoms. It will take a few months to see the change.

## 2024-02-08 NOTE — PROGRESS NOTES
Subjective   Patient ID: Gema High is a 58 y.o. female who presents for Annual Exam (ALEXSANDRA//LAST PAP: 2015/LAST MAMM://Chaperone Declined, ALIN Cantu/).  Feels awful.  Gained 30# in 6 months.  Hot flashes, night sweats, moods, bloating.  Incontinence.    Started weight  loss injections.  Gave up on her bladder.  No exercising, foot problems.   Feels like this menopause thing has to pass.  Not interested in hormone therapy.  Feels like she has about 5 days per month when everything feels good, then the rest of the month is bad.   Burning on skin with urination, all the time.    , Not having sex.     Review of Systems   Constitutional:  Positive for fatigue and unexpected weight change.   Genitourinary:  Positive for dysuria.   Psychiatric/Behavioral:  Positive for dysphoric mood.        Objective   Physical Exam  Vitals reviewed.   Constitutional:       Appearance: Normal appearance. She is obese.   HENT:      Head: Normocephalic.   Neck:      Thyroid: No thyroid mass or thyromegaly.   Pulmonary:      Effort: Pulmonary effort is normal.   Chest:   Breasts:     Right: Normal.      Left: Normal.   Abdominal:      Palpations: Abdomen is soft.   Genitourinary:     General: Normal vulva.      Exam position: Lithotomy position.      Vagina: Normal.      Cervix: Normal.      Uterus: Normal.       Adnexa: Right adnexa normal and left adnexa normal.   Musculoskeletal:         General: Normal range of motion.      Cervical back: Normal range of motion and neck supple.   Lymphadenopathy:      Upper Body:      Right upper body: No supraclavicular or axillary adenopathy.      Left upper body: No supraclavicular or axillary adenopathy.   Skin:     General: Skin is warm and dry.   Neurological:      General: No focal deficit present.      Mental Status: She is alert.   Psychiatric:         Mood and Affect: Mood normal.         Behavior: Behavior normal.         Thought Content: Thought content normal.          Judgment: Judgment normal.         Assessment/Plan   Problem List Items Addressed This Visit             ICD-10-CM       Genitourinary and Reproductive    Genitourinary syndrome of menopause - Primary N95.8     Will try course of vaginal estrogen. Small amount to affected area twice weekly.         Well woman exam Z01.419     Thank you for your visit for well woman care.  A pap smear and HPV test was performed today. The results will be in MyChart in about three weeks.  You are due to have a mammogram performed. Please schedule.    We discussed your concerns about menopause and your physical and mental health. You do not want to try hormone therapy.  We can try some vaginal estrogen for your urinary symptoms. It will take a few months to see the change.          Other Visit Diagnoses         Codes    Cervical cancer screening     Z12.4    Relevant Orders    THINPREP PAP TEST                 Susu Wei MD 02/08/24 9:46 AM

## 2024-02-20 ENCOUNTER — APPOINTMENT (OUTPATIENT)
Dept: RADIOLOGY | Facility: HOSPITAL | Age: 59
End: 2024-02-20
Payer: COMMERCIAL

## 2024-02-28 ENCOUNTER — APPOINTMENT (OUTPATIENT)
Dept: RADIOLOGY | Facility: HOSPITAL | Age: 59
End: 2024-02-28
Payer: COMMERCIAL

## 2024-02-28 ENCOUNTER — HOSPITAL ENCOUNTER (OUTPATIENT)
Dept: RADIOLOGY | Facility: CLINIC | Age: 59
Discharge: HOME | End: 2024-02-28
Payer: COMMERCIAL

## 2024-02-28 DIAGNOSIS — M79.671 FOOT PAIN, BILATERAL: ICD-10-CM

## 2024-02-28 DIAGNOSIS — M79.672 FOOT PAIN, BILATERAL: ICD-10-CM

## 2024-02-28 DIAGNOSIS — M54.16 LUMBAR RADICULOPATHY: ICD-10-CM

## 2024-02-28 DIAGNOSIS — R20.2 PARESTHESIAS: ICD-10-CM

## 2024-02-28 PROCEDURE — 72148 MRI LUMBAR SPINE W/O DYE: CPT

## 2024-02-28 PROCEDURE — 72148 MRI LUMBAR SPINE W/O DYE: CPT | Performed by: RADIOLOGY

## 2024-02-28 NOTE — PROGRESS NOTES
Subjective   Patient ID: Gema High is a 58 y.o. female who presents for her 4 month follow up multiple medical conditions and follow up weight loss       She is taking Saxenda daily    She did not lose any weight when she did the 0.1 for a week   She has been increasing Saxenda weekly   She had a terrible night last night with constipation     She can not be active due to the pain in her feet.  She saw neurology and had to wait 4 months for that appt   Neurology ordered scans. Insurance denied the full body scan.  She called for an appt for EMG and the appt was in 5/2024 so she got frustrated and hung up   She continues to have pain in her ankle if she stands too long she gets edema   She has had burning in her feet for years but now it is unbearable     Her migraine was horrible this week due to the weather changes.      HEALTH:  PAP 2/15 -, 2/2024- with Dr Wei   Mammo 2/17, 2/19-,  ordered 10/2023   Mammo 3/2022 focal asymmetry right breast and follow up was negative,  BD 6/2022 T-2.2,   Colon 2015, 4/2019 hyperplastic polyp, 6/2022 and Q 5   Ca cardiac score 2/19 Zero    EKG 12/09, 12/18, 3/2022  Urine 12/18   Flu 2012, 10/2021, 12/2022, 10/2023 at work  TDAP 2004, 12/18  Prevnar never  Pneumovax never   Zostavax never   Shingrix recommended and will check coverage   Pfizer CVD vaccine 3/2021 and 4/2021 booster 10/2022 and 1/2022   Opth- She sees regularly for dry eyes/ Sjogren's. No glaucoma, MD or HCQ toxicity   She is using Restasis OU BID and Cequa drops       Review of Systems  All systems negative except those listed in the HPI      Objective   Visit Vitals  /60 (BP Location: Left arm, Patient Position: Sitting)   Pulse 68    Body mass index is 30.29 kg/m².     Physical Exam  Vitals reviewed.   Constitutional:       Appearance: Normal appearance. She is obese.   HENT:      Head: Normocephalic.      Right Ear: Tympanic membrane, ear canal and external ear normal.      Left Ear: Tympanic membrane,  "ear canal and external ear normal.      Nose: Nose normal.      Mouth/Throat:      Pharynx: Oropharynx is clear.   Eyes:      Conjunctiva/sclera: Conjunctivae normal.   Cardiovascular:      Rate and Rhythm: Normal rate and regular rhythm.      Pulses: Normal pulses.      Heart sounds: Normal heart sounds.   Pulmonary:      Effort: Pulmonary effort is normal.      Breath sounds: Normal breath sounds.   Abdominal:      General: Bowel sounds are normal.      Palpations: Abdomen is soft.      Comments: tenderness lower abdomen without rebound   Musculoskeletal:         General: Normal range of motion.      Cervical back: Normal range of motion and neck supple.      Comments:  left ankle tenderness and tenderness both knees    Skin:     General: Skin is warm.   Neurological:      General: No focal deficit present.      Mental Status: She is alert and oriented to person, place, and time.   Psychiatric:         Mood and Affect: Mood normal.         Behavior: Behavior normal.         Thought Content: Thought content normal.         Judgment: Judgment normal.       Assessment/Plan   Problem List Items Addressed This Visit       Fibromyalgia    Gastroparesis    Irritable bowel syndrome    Mixed stress and urge urinary incontinence    Osteoporosis    Rheumatoid arthritis (CMS/HCC)    Sjogrens syndrome (CMS/HCC)    Spinal stenosis    Tobacco dependence     Other Visit Diagnoses       High risk medication use    -  Primary    BMI 30.0-30.9,adult        Relevant Medications    liraglutide, weight loss, (Saxenda) 3 mg/0.5 mL (18 mg/3 mL) pen injector injection    pen needle, diabetic 32 gauge x 5/32\" needle            Follow up completed  Reviewed her labs form 2/2024       Chronic bilateral foot pain: On exam: left ankle tenderness and tenderness both knees 2/2024. She can not be active due to the pain in her feet. She saw neurology and had to wait 4 months for that appt. Neurology ordered scans. Insurance denied the full body " scan. She called for an appt for EMG and the appt was in 5/2024 so she got frustrated and hung up. She continues to have pain in her ankle if she stands too long she gets edema. She has had burning in her feet for years but now it is unbearable. She is using topical Capsaicin cream on her feet. Recommend she see if she can ride a reclining stationary bike. Warned patient that smoking is a vasoconstrictor, she needs to quit smoking!!   Bunion on right foot and arthritis outer left foot   Avoid walking barefooted and wear good shoes   She is following with podiatry and last visit 10/2023  Continue gabapentin 600 mg 2 tablets Q 8 hours    MR L/S 2/2024 There is a transitional lumbosacral vertebral body which is  lumbarized on the right and sacralized on the left and for the sake of this dictation will be labeled L5. The coronal  images demonstrate a mild dextrocurvature of the lumbar spine. There is minimal retrolisthesis of L1 on L2. There is multilevel spondylosis with varying degrees of spinal canal and neural foraminal narrowing   She saw Dr Cintron in 2/2024 and she needs a bone scan as well as an EMG nerve conduction study of the lower extremities bilaterally.      She stopped smoking in 1/2023. She is vaping nicotine.   She started smoking at age 15. Her father smoked also.   Nicotine dependence I spent more than 3 minutes counseling patient about the need for smoking cessation and how I can support efforts when patient is ready to quit. Discussed nicotine replacement therapy, Varenicline, Bupropion, hypnosis, support groups and acupuncture as potential options. Patient currently has no signs or symptoms of tobacco related disease.  (Dx code F17.2 or Z87.891)(Billing code 25274 or 96701)      Her weight is 171 pounds with BMI at 30.36. We spent 15 minutes discussing diet and exercise. The struggle of weight loss persists   She has gained 30 pounds since 3/2023  She has been doing the FODMAP diet for 10  years    Warned patient these medications are a lifetime commitment and if she stops the medication she will have aggressive weight gain and when restarting the medication it loses it's efficacy.   Warned patient of wasting while taking Saxenda.  Decrease Saxenda to 1.8 mg daily for a week, then we will increase to 2.4 mg daily for a couple week then we will increase to 3.0 mg daily   She has lost 4 pounds since beginning Saxenda. Refilled 2/2024      BP in normal range in office. We will continue to monitor   EKG was normal 3/2022 at  pre hand surgery   Recommend she monitor her BP periodically and call with elevated readings      I have spent 15 min face to face with this patient discussing their cardiac risk and behavioral therapies of nutrition choices and exercise. We are trying to eliminate habits that are contributing to their cardiac risk.  We agreed on a plan of how they can reduce their current CV risk   The patient's 10 yr CV risk was estimated at 2.3 % 2/2024      Elevated lipids:  and HDL 66 on labs in 10/2023  Explained goal for LDL to be less than 100 and ideally less than 70   CT Cardiac Score 2/19 Zero   Discussed diet and exercise to better control lipids      Thyroid Bx in 9/2019 showed benign nodule: TSH 1.15 on labs in 10/2023  Thyroid Bx 2022 negative   She is following with Dr Sousa in endocrinology      Migraines -   Migraines started about 20 years ago    Continue Tramadol 50 mg daily and pilocarpine 7.5 mg daily   Continue naratriptan 2.5 mg prn onset of HA and Ajovy as directed   She saw DEVANTE Jordan in 12/2023 and continue Ajovy and naratriptan      Right submandibular gland tenderness radiating to the right ear -   The Sjogren dries the nasal passages a lot and they are raw.   Has been evaluated by allergist with all the left side face issues and nothing found     GERD -   She has a history of HH  Continue Pepcid 40 mg daily   She had no improvement with Dexilant   Avoid  Reglan while taking Pristiq   She has been on Carafate and Prilosec in the past   Xray Upper GI 6/2021 showed mild to moderate tertiary contractions in the mid to distal esophagus resulting in moderate gastroesophageal reflux. Delay in passage of contrast stomach duodenum, may represent sequela of gastro paresis     Depression is all from her stressors and pain:   Continue Adderall 15 mg daily, Trazodone 100 mg nightly and Pristiq 25 mg daily   She is seeing psych and they are doing refills, contracts and drug screen.   Discussed food satiation with SSRIs. She will be mindful of this      OAB:  Previously failed Trospium due to experiencing adverse side effects and both Gemtesa and Myrbetriq were cost-prohibitive.   - Cystoscopy + intradetrusor Botox injection of 100 U @ 4 sites 5/2023. Tolerated the procedure well with normal cystoscopy findings: Normal bladder mucosa. No stones, masses, or tumors seen.   She saw Dr. Viktoriya Fuentes in 5/2023      IBS and Constipation - On exam: tenderness lower abdomen without rebound 2/2024   She is doing a FODMAP diet   Hemorrhoids were removed with Dr Toney nonsurgically.  Continue Linzess 145 mg a day. This helps the constipation but not the bloating   Colon 6/2022 and Q 5   She saw GI in 11/2023      Seronegative RA and Sjogrens -   She restarted MTX in 2022 due to her arthritis being so bad   The main cause of her fatigue, muscle pain, GI issues and insomnia is Fibromyalgia   She takes trazodone for the sleep issues.   Coldness and tingling of hands - Worse right hand. This could be nerve impingement.   Continue Imuran 50 mg BID   Continue  mg daily and pilocarpine 7.5 mg daily  Continue gabapentin 600 mg 2 tablets Q 8 hours   She is following [facility]ology at [facility]   Recommend she look into somatic tracking      Cervical disc d/o:  She failed pain management and the EMG do not show any radiculopathy  No surgery for her hands despite the numbness, pain and  coldness   some atrophy right thenar region so concerned there is some innervation in the hand      Mild spinal scoliosis, turning to the right-  She sees pain Management and plans to get nerve blocks soon   She had C spine fusion and some severe nerve pain in her neck and jaw and the X-rays all negative.     Rotary cuff tear surgery 11/8/17. She still has a lot of pain in the left shoulder.   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   Mammo 3/2022 was focal asymmetry right breast and follow up was negative.  Orders placed for mammogram 10/2023  Breast exam normal 10/2023     BD 6/2022 -2.2 L/S and hip -2.0 at [facility]. Continue OTC Calcium 500 mg BID, OTC Vitamin D 2000 UT daily and eat 2 servings of calcium enriched foods daily.   Discussed importance of weight bearing exercises   Colonoscopy 6/2022 and Q 5      Ophth  She sees regularly for dry eyes/ Sjogren's. No glaucoma, MD or HCQ toxicity.   She is using Restasis OU BID and Cequa drops   She will have her next eye exam faxed to my office in order to update her medical records.      Flu 2012, 10/2021, 12/2022, 10/2023 yearly at work  TDAP 2004, 12/18  Prevnar never  Pneumovax never   Zostavax never   Shingrix recommended and will check coverage   Pfizer CVD 3/26/2021 and 4/16/2021 booster 10/8/2022 and 1/9/2022      Some elements in the chart were copied from Dr. Diaz's last office visit with patient.   Notes have been updated where appropriate, and reflect my current medical decision making from today.      Return in 4 months for follow  up or sooner if needed  (CPE due 10/2024)       Scribe Attestation  By signing my name below, I, Kimberly Eastman , [employer]   attest that this documentation has been prepared under the direction and in the presence of Mariana Diaz MD.

## 2024-02-29 ENCOUNTER — OFFICE VISIT (OUTPATIENT)
Dept: PRIMARY CARE | Facility: CLINIC | Age: 59
End: 2024-02-29
Payer: COMMERCIAL

## 2024-02-29 VITALS
BODY MASS INDEX: 30.3 KG/M2 | OXYGEN SATURATION: 98 % | WEIGHT: 171 LBS | HEIGHT: 63 IN | HEART RATE: 68 BPM | SYSTOLIC BLOOD PRESSURE: 120 MMHG | DIASTOLIC BLOOD PRESSURE: 60 MMHG

## 2024-02-29 DIAGNOSIS — K58.1 IRRITABLE BOWEL SYNDROME WITH CONSTIPATION: ICD-10-CM

## 2024-02-29 DIAGNOSIS — F17.200 TOBACCO DEPENDENCE: ICD-10-CM

## 2024-02-29 DIAGNOSIS — Z79.899 HIGH RISK MEDICATION USE: Primary | ICD-10-CM

## 2024-02-29 DIAGNOSIS — K31.84 GASTROPARESIS: ICD-10-CM

## 2024-02-29 DIAGNOSIS — M79.7 FIBROMYALGIA: ICD-10-CM

## 2024-02-29 DIAGNOSIS — M81.0 AGE-RELATED OSTEOPOROSIS WITHOUT CURRENT PATHOLOGICAL FRACTURE: ICD-10-CM

## 2024-02-29 DIAGNOSIS — M35.00 SJOGREN'S SYNDROME, WITH UNSPECIFIED ORGAN INVOLVEMENT (MULTI): ICD-10-CM

## 2024-02-29 DIAGNOSIS — M06.09 RHEUMATOID ARTHRITIS OF MULTIPLE SITES WITH NEGATIVE RHEUMATOID FACTOR (MULTI): ICD-10-CM

## 2024-02-29 DIAGNOSIS — M48.07 SPINAL STENOSIS OF LUMBOSACRAL REGION: ICD-10-CM

## 2024-02-29 DIAGNOSIS — N39.46 MIXED STRESS AND URGE URINARY INCONTINENCE: ICD-10-CM

## 2024-02-29 PROCEDURE — 3008F BODY MASS INDEX DOCD: CPT | Performed by: INTERNAL MEDICINE

## 2024-02-29 PROCEDURE — 99214 OFFICE O/P EST MOD 30 MIN: CPT | Performed by: INTERNAL MEDICINE

## 2024-02-29 RX ORDER — PEN NEEDLE, DIABETIC 30 GX3/16"
NEEDLE, DISPOSABLE MISCELLANEOUS
Qty: 90 EACH | Refills: 3 | Status: SHIPPED | OUTPATIENT
Start: 2024-02-29 | End: 2024-05-01 | Stop reason: ALTCHOICE

## 2024-02-29 RX ORDER — LIRAGLUTIDE 6 MG/ML
3 INJECTION, SOLUTION SUBCUTANEOUS DAILY
Qty: 15 ML | Refills: 3 | Status: SHIPPED | OUTPATIENT
Start: 2024-02-29 | End: 2024-04-16 | Stop reason: WASHOUT

## 2024-02-29 ASSESSMENT — PATIENT HEALTH QUESTIONNAIRE - PHQ9
2. FEELING DOWN, DEPRESSED OR HOPELESS: NOT AT ALL
1. LITTLE INTEREST OR PLEASURE IN DOING THINGS: NOT AT ALL
SUM OF ALL RESPONSES TO PHQ9 QUESTIONS 1 AND 2: 0

## 2024-02-29 NOTE — PATIENT INSTRUCTIONS
Obesity is a chronic, relapsing, progressive, treatable, multifactorial, neurobehavioral disease, where in an increase in body fat promotes adipose (fat) tissue dysfunction, as well as biomechanical stress on the body which can result in adverse metabolic, biomechanical, and psychosocial health consequences, in addition to reducing quality of life and lifespan.   Without treatment, obesity is likely to progress and worsen, further increase the patient's risk for numerous health conditions caused by excess adiposity and increasing the risk for premature death.     - Counseling provided on impact of diet, physical activity, stress & sleep in treatment of obesity.    - Counseled on reduced calorie, high protein, high fiber, whole foods diet.  Counseling on benefits of avoidance of frequent intake of processed foods and liquid calories.   - Counseled on behavioral modification strategies and tools to support weight loss.   - Reviewed pathophysiology of obesity in context of relationship to nutrition, energy balance and environmental factors that influence nutrition and energy balance outcomes.  - Counseled on various behavioral strategies and self monitoring practices to enhance understanding and individual assessment of energy balance, how various changes in types of foods consumed and macronutrient distribution can impact appetite regulation and be used as a tool in treatment of obesity.       The ability to age comfortably depends on how you invest in your body.   Include physical activity in your daily routine. Physical activity increases blood flow to your whole body, including your brain. ...  Eat a healthy diet. A heart-healthy diet may benefit your brain.   Stay mentally active. Be social.   Treat cardiovascular disease.  No smoking, excessive EtOH intake or illicit drug use.

## 2024-03-05 ENCOUNTER — HOSPITAL ENCOUNTER (OUTPATIENT)
Dept: NEUROLOGY | Facility: HOSPITAL | Age: 59
Discharge: HOME | End: 2024-03-05
Payer: COMMERCIAL

## 2024-03-05 DIAGNOSIS — R20.2 PARESTHESIAS: ICD-10-CM

## 2024-03-05 DIAGNOSIS — M47.816 LUMBAR SPONDYLOSIS: ICD-10-CM

## 2024-03-05 DIAGNOSIS — M79.672 FOOT PAIN, BILATERAL: ICD-10-CM

## 2024-03-05 DIAGNOSIS — M79.671 FOOT PAIN, BILATERAL: ICD-10-CM

## 2024-03-05 DIAGNOSIS — M54.16 LUMBAR RADICULOPATHY: ICD-10-CM

## 2024-03-05 PROCEDURE — 95886 MUSC TEST DONE W/N TEST COMP: CPT | Performed by: PSYCHIATRY & NEUROLOGY

## 2024-03-05 PROCEDURE — 95910 NRV CNDJ TEST 7-8 STUDIES: CPT | Performed by: PSYCHIATRY & NEUROLOGY

## 2024-03-20 DIAGNOSIS — G90.529 COMPLEX REGIONAL PAIN SYNDROME TYPE 1 OF LOWER EXTREMITY, UNSPECIFIED LATERALITY: ICD-10-CM

## 2024-04-12 NOTE — PROGRESS NOTES
History of Present Complaint:  The patient was referred to us by Referring Provider: Mayank Cintron MD .  Bilateral feet pain with normal EMG this is 58 y.o.  female with a past history of obesity BMI 30, smoker, depression, fibromyalgia, IBS, bilateral feet pain with normal EMG and no foraminal or canal stenosis in the MRI presenting with left more than right foot burning sensation and pain that started a year and a half ago mostly in the left ankle spread to the left foot and then now in the right foot feels the bump of the foot there is significant poker there.  Patient cannot keep her feet under the covers touching the covers make an uncomfortable she has typical signs of hyperalgesia allodynia. Failed Gabapentin which she's taking TID from Dr. Pagan.There are no paralysis no saddle paresthesia no incontinence no red flags  Patient never had skin biopsy to evaluate for small fiber neuropathy      Procedures:   None    Portions of record reviewed for pertinent issues: active problem list, medication list, allergies, family history, social history, notes from last encounter, encounters, lab results, imaging and other available records.    I have personally reviewed the OAR[facility] report for this patient. This report is scanned into the electronic medical record. I have considered the risks of abuse, dependence, addiction and diversion. It showed: Adderall 15 mg x 2, gabapentin 300 mg 3 times daily from Dr. Pagan,  OPIOID RISK ASSESSMENT SCORE 1/26  Aberrant behavior: None      Diagnostic studies:  3/5/2024 bilateral lower extremity EMG were normal    2/28/2024 MRI lumbar spine minimal degenerative changes  FINDINGS:  There is a transitional lumbosacral vertebral body which is  lumbarized on the right and sacralized on the left and for the sake  of this dictation will be labeled L5.      The coronal  images demonstrate a mild dextrocurvature of the  lumbar spine.      There is minimal retrolisthesis of L1 on  L2.      There are degenerative signal changes noted along endplates within  lumbar region.      The visualized spinal cord demonstrates no signal abnormality within  it.  The conus medullaris is normally positioned terminating at the  L1/2 level.      There is diminished disc signal at the L1/2 through L4/5 levels  compatible with degenerative disc disease.      At the L5/S1 level,  there is no significant spinal canal stenosis or  neuroforaminal stenosis.      At the L4/L5 level,  there is a posterior disc bulge along with  degenerative facet changes and ligamentum flavum hypertrophy  contributing to mild spinal canal narrowing. There is moderate right  and mild-to-moderate left-sided neural foraminal narrowing.      At the L3/L4 level,  there is a mild posterior disc bulge along with  degenerative facet changes and ligamentum flavum hypertrophy  contributing to mild spinal canal narrowing. There is  mild-to-moderate encroachment upon the neural foramen bilaterally.      At the L2/L3 level,  there is a posterior disc bulge along with  degenerative facet changes and ligamentum flavum hypertrophy  contributing to mild spinal canal narrowing. There is moderate  encroachment upon the neural foramen bilaterally.      At the L1/L2 level,  there is minimal retrolisthesis of L1 on L2, a  posterior disc bulge along with degenerative facet changes and  ligamentum flavum hypertrophy contributing to mild spinal canal  narrowing. There is moderate right and mild-to-moderate left-sided  neural foraminal narrowing.      At the T12/L1 level,  there is a small central disc herniation  measuring 1 mm in AP dimension without significant spinal canal or  neural foraminal narrowing.      At the T11/12 level, there are degenerative facet changes without  significant spinal canal narrowing. There is moderate encroachment  upon the neural foramen bilaterally.      At the T10/11 level, there is a posterior disc/osteophyte  complex  contributing to mild spinal canal narrowing. There are degenerative  facet changes. There is mild-to-moderate encroachment upon the neural  foramen bilaterally.          IMPRESSION:  There is a transitional lumbosacral vertebral body which is  lumbarized on the right and sacralized on the left and for the sake  of this dictation will be labeled L5.      The coronal  images demonstrate a mild dextrocurvature of the  lumbar spine.      There is minimal retrolisthesis of L1 on L2.      There is multilevel spondylosis with varying degrees of spinal canal  and neural foraminal narrowing as described above.  Data reviewed:  FINDINGS:  There is a transitional lumbosacral vertebral body which is  lumbarized on the right and sacralized on the left and for the sake  of this dictation will be labeled L5.      The coronal  images demonstrate a mild dextrocurvature of the  lumbar spine.      There is minimal retrolisthesis of L1 on L2.      There are degenerative signal changes noted along endplates within  lumbar region.      The visualized spinal cord demonstrates no signal abnormality within  it.  The conus medullaris is normally positioned terminating at the  L1/2 level.      There is diminished disc signal at the L1/2 through L4/5 levels  compatible with degenerative disc disease.      At the L5/S1 level,  there is no significant spinal canal stenosis or  neuroforaminal stenosis.      At the L4/L5 level,  there is a posterior disc bulge along with  degenerative facet changes and ligamentum flavum hypertrophy  contributing to mild spinal canal narrowing. There is moderate right  and mild-to-moderate left-sided neural foraminal narrowing.      At the L3/L4 level,  there is a mild posterior disc bulge along with  degenerative facet changes and ligamentum flavum hypertrophy  contributing to mild spinal canal narrowing. There is  mild-to-moderate encroachment upon the neural foramen bilaterally.      At the  L2/L3 level,  there is a posterior disc bulge along with  degenerative facet changes and ligamentum flavum hypertrophy  contributing to mild spinal canal narrowing. There is moderate  encroachment upon the neural foramen bilaterally.      At the L1/L2 level,  there is minimal retrolisthesis of L1 on L2, a  posterior disc bulge along with degenerative facet changes and  ligamentum flavum hypertrophy contributing to mild spinal canal  narrowing. There is moderate right and mild-to-moderate left-sided  neural foraminal narrowing.      At the T12/L1 level,  there is a small central disc herniation  measuring 1 mm in AP dimension without significant spinal canal or  neural foraminal narrowing.      At the T11/12 level, there are degenerative facet changes without  significant spinal canal narrowing. There is moderate encroachment  upon the neural foramen bilaterally.      At the T10/11 level, there is a posterior disc/osteophyte complex  contributing to mild spinal canal narrowing. There are degenerative  facet changes. There is mild-to-moderate encroachment upon the neural  foramen bilaterally.          IMPRESSION:  There is a transitional lumbosacral vertebral body which is  lumbarized on the right and sacralized on the left and for the sake  of this dictation will be labeled L5.      The coronal  images demonstrate a mild dextrocurvature of the  lumbar spine.      There is minimal retrolisthesis of L1 on L2.      There is multilevel spondylosis with varying degrees of spinal canal  and neural foraminal narrowing as described above.        Employment/disability/litigation: [employer] works remotely from home    Social History:  has 2 children 1 grandchild finished [employer] degree and had short classes in business, she is smoker and vapor now denies drinking or use of illicit drugs      Review of Systems   HENT: Negative.     Eyes: Negative.    Respiratory: Negative.     Cardiovascular: Negative.     Gastrointestinal: Negative.    Endocrine: Negative.    Genitourinary: Negative.    Musculoskeletal:  Positive for arthralgias and myalgias.   Skin: Negative.    Neurological: Negative.    Hematological: Negative.    Psychiatric/Behavioral: Negative.            Physical Exam  Vitals and nursing note reviewed.   Constitutional:       Appearance: Normal appearance.   HENT:      Head: Normocephalic and atraumatic.      Nose: Nose normal.   Eyes:      Extraocular Movements: Extraocular movements intact.      Conjunctiva/sclera: Conjunctivae normal.      Pupils: Pupils are equal, round, and reactive to light.   Cardiovascular:      Rate and Rhythm: Normal rate and regular rhythm.      Pulses: Normal pulses.      Heart sounds: Normal heart sounds.   Pulmonary:      Effort: Pulmonary effort is normal.      Breath sounds: Normal breath sounds.   Abdominal:      General: Abdomen is flat. Bowel sounds are normal.      Palpations: Abdomen is soft.   Skin:     General: Skin is warm.   Neurological:      General: No focal deficit present.      Mental Status: She is alert and oriented to person, place, and time.      Comments: Allodynia and hyperalgesia on the left ankle area, normal pulses and normal venous drainage, minor trophic changes   Psychiatric:         Mood and Affect: Mood normal.         Behavior: Behavior normal.            Assessment  Pleasant 58 years old with a history of fibromyalgia IBS and progressive bilateral feet burning sensation started in the left foot that started swelled up and causing significant ankle pain with hyperalgesia allodynia specially at night then moved over to the right foot and feels like a painful ball and now bottom of the right foot.  In association to her fibromyalgia pain.  Patient has been on gabapentin for many years without much benefit.  She takes Zoloft, Failed Cymbalta caused her more depression.  Her exam today correlate with possibility of CRPS/neuropathy we will plan on left  LSP at L3 to see if that will break the cycle pain and spasm.  Will start her on IV infusion therapy as well.  Neuro supplement ordered in addition to referral to the comprehensive program           Plan  At least 50% of the visit was involved in the discussion of the options for treatment. We discussed exercises, medication, interventional therapies and surgery. Healthy life style is essential with patient hard work to achieve the wellness. In addition; discussion with the patient and/or family about any of the diagnostic results, impressions and/or recommended diagnostic studies, prognosis, risks and benefits of treatment options, instructions for treatment and/or follow-up, importance of compliance with chosen treatment options, risk-factor reduction, and patient/family education.         Pool therapy, walking in the pool, at least 3x per week for 30 minutes  Left L3 LSB after lorazepam 2 mg p.o. to give the patient a window of opportunity to improve function and decrease pain so she can participate in our full comprehensive program  IV infusion therapy  Neuro supplement ordered  Referral to acupuncture aquatic therapy and behavioral health  Healthy lifestyle and anti-inflammatory diet in addition to weight control discussed with the patient  Alternative chronic pain therapies was discussed, encouraged and information was handed  Return to Clinic 3 months       *Please note this report has been produced using speech recognition software and may contain errors related to that system including grammar, punctuation and spelling as well as words and phrases that may be inappropriate. If there are questions or concerns, please feel free to contact me to clarify.    Garrett Kilpatrick MD

## 2024-04-12 NOTE — H&P (VIEW-ONLY)
History of Present Complaint:  The patient was referred to us by Referring Provider: Mayank Cintron MD .  Bilateral feet pain with normal EMG this is 58 y.o.  female with a past history of obesity BMI 30, smoker, depression, fibromyalgia, IBS, bilateral feet pain with normal EMG and no foraminal or canal stenosis in the MRI presenting with left more than right foot burning sensation and pain that started a year and a half ago mostly in the left ankle spread to the left foot and then now in the right foot feels the bump of the foot there is significant poker there.  Patient cannot keep her feet under the covers touching the covers make an uncomfortable she has typical signs of hyperalgesia allodynia. Failed Gabapentin which she's taking TID from Dr. Pagan.There are no paralysis no saddle paresthesia no incontinence no red flags  Patient never had skin biopsy to evaluate for small fiber neuropathy      Procedures:   None    Portions of record reviewed for pertinent issues: active problem list, medication list, allergies, family history, social history, notes from last encounter, encounters, lab results, imaging and other available records.    I have personally reviewed the [facility]AR[facility] report for this patient. This report is scanned into the electronic medical record. I have considered the risks of abuse, dependence, addiction and diversion. It showed: Adderall 15 mg x 2, gabapentin 300 mg 3 times daily from Dr. Pagan,  OPIOID RISK ASSESSMENT SCORE 1/26  Aberrant behavior: None      Diagnostic studies:  3/5/2024 bilateral lower extremity EMG were normal    2/28/2024 MRI lumbar spine minimal degenerative changes  FINDINGS:  There is a transitional lumbosacral vertebral body which is  lumbarized on the right and sacralized on the left and for the sake  of this dictation will be labeled L5.      The coronal  images demonstrate a mild dextrocurvature of the  lumbar spine.      There is minimal retrolisthesis of L1 on  L2.      There are degenerative signal changes noted along endplates within  lumbar region.      The visualized spinal cord demonstrates no signal abnormality within  it.  The conus medullaris is normally positioned terminating at the  L1/2 level.      There is diminished disc signal at the L1/2 through L4/5 levels  compatible with degenerative disc disease.      At the L5/S1 level,  there is no significant spinal canal stenosis or  neuroforaminal stenosis.      At the L4/L5 level,  there is a posterior disc bulge along with  degenerative facet changes and ligamentum flavum hypertrophy  contributing to mild spinal canal narrowing. There is moderate right  and mild-to-moderate left-sided neural foraminal narrowing.      At the L3/L4 level,  there is a mild posterior disc bulge along with  degenerative facet changes and ligamentum flavum hypertrophy  contributing to mild spinal canal narrowing. There is  mild-to-moderate encroachment upon the neural foramen bilaterally.      At the L2/L3 level,  there is a posterior disc bulge along with  degenerative facet changes and ligamentum flavum hypertrophy  contributing to mild spinal canal narrowing. There is moderate  encroachment upon the neural foramen bilaterally.      At the L1/L2 level,  there is minimal retrolisthesis of L1 on L2, a  posterior disc bulge along with degenerative facet changes and  ligamentum flavum hypertrophy contributing to mild spinal canal  narrowing. There is moderate right and mild-to-moderate left-sided  neural foraminal narrowing.      At the T12/L1 level,  there is a small central disc herniation  measuring 1 mm in AP dimension without significant spinal canal or  neural foraminal narrowing.      At the T11/12 level, there are degenerative facet changes without  significant spinal canal narrowing. There is moderate encroachment  upon the neural foramen bilaterally.      At the T10/11 level, there is a posterior disc/osteophyte  complex  contributing to mild spinal canal narrowing. There are degenerative  facet changes. There is mild-to-moderate encroachment upon the neural  foramen bilaterally.          IMPRESSION:  There is a transitional lumbosacral vertebral body which is  lumbarized on the right and sacralized on the left and for the sake  of this dictation will be labeled L5.      The coronal  images demonstrate a mild dextrocurvature of the  lumbar spine.      There is minimal retrolisthesis of L1 on L2.      There is multilevel spondylosis with varying degrees of spinal canal  and neural foraminal narrowing as described above.  Data reviewed:  FINDINGS:  There is a transitional lumbosacral vertebral body which is  lumbarized on the right and sacralized on the left and for the sake  of this dictation will be labeled L5.      The coronal  images demonstrate a mild dextrocurvature of the  lumbar spine.      There is minimal retrolisthesis of L1 on L2.      There are degenerative signal changes noted along endplates within  lumbar region.      The visualized spinal cord demonstrates no signal abnormality within  it.  The conus medullaris is normally positioned terminating at the  L1/2 level.      There is diminished disc signal at the L1/2 through L4/5 levels  compatible with degenerative disc disease.      At the L5/S1 level,  there is no significant spinal canal stenosis or  neuroforaminal stenosis.      At the L4/L5 level,  there is a posterior disc bulge along with  degenerative facet changes and ligamentum flavum hypertrophy  contributing to mild spinal canal narrowing. There is moderate right  and mild-to-moderate left-sided neural foraminal narrowing.      At the L3/L4 level,  there is a mild posterior disc bulge along with  degenerative facet changes and ligamentum flavum hypertrophy  contributing to mild spinal canal narrowing. There is  mild-to-moderate encroachment upon the neural foramen bilaterally.      At the  L2/L3 level,  there is a posterior disc bulge along with  degenerative facet changes and ligamentum flavum hypertrophy  contributing to mild spinal canal narrowing. There is moderate  encroachment upon the neural foramen bilaterally.      At the L1/L2 level,  there is minimal retrolisthesis of L1 on L2, a  posterior disc bulge along with degenerative facet changes and  ligamentum flavum hypertrophy contributing to mild spinal canal  narrowing. There is moderate right and mild-to-moderate left-sided  neural foraminal narrowing.      At the T12/L1 level,  there is a small central disc herniation  measuring 1 mm in AP dimension without significant spinal canal or  neural foraminal narrowing.      At the T11/12 level, there are degenerative facet changes without  significant spinal canal narrowing. There is moderate encroachment  upon the neural foramen bilaterally.      At the T10/11 level, there is a posterior disc/osteophyte complex  contributing to mild spinal canal narrowing. There are degenerative  facet changes. There is mild-to-moderate encroachment upon the neural  foramen bilaterally.          IMPRESSION:  There is a transitional lumbosacral vertebral body which is  lumbarized on the right and sacralized on the left and for the sake  of this dictation will be labeled L5.      The coronal  images demonstrate a mild dextrocurvature of the  lumbar spine.      There is minimal retrolisthesis of L1 on L2.      There is multilevel spondylosis with varying degrees of spinal canal  and neural foraminal narrowing as described above.        Employment/disability/litigation: [employer] works remotely from home    Social History:  has 2 children 1 grandchild finished [employer] degree and had short classes in business, she is smoker and vapor now denies drinking or use of illicit drugs      Review of Systems   HENT: Negative.     Eyes: Negative.    Respiratory: Negative.     Cardiovascular: Negative.     Gastrointestinal: Negative.    Endocrine: Negative.    Genitourinary: Negative.    Musculoskeletal:  Positive for arthralgias and myalgias.   Skin: Negative.    Neurological: Negative.    Hematological: Negative.    Psychiatric/Behavioral: Negative.            Physical Exam  Vitals and nursing note reviewed.   Constitutional:       Appearance: Normal appearance.   HENT:      Head: Normocephalic and atraumatic.      Nose: Nose normal.   Eyes:      Extraocular Movements: Extraocular movements intact.      Conjunctiva/sclera: Conjunctivae normal.      Pupils: Pupils are equal, round, and reactive to light.   Cardiovascular:      Rate and Rhythm: Normal rate and regular rhythm.      Pulses: Normal pulses.      Heart sounds: Normal heart sounds.   Pulmonary:      Effort: Pulmonary effort is normal.      Breath sounds: Normal breath sounds.   Abdominal:      General: Abdomen is flat. Bowel sounds are normal.      Palpations: Abdomen is soft.   Skin:     General: Skin is warm.   Neurological:      General: No focal deficit present.      Mental Status: She is alert and oriented to person, place, and time.      Comments: Allodynia and hyperalgesia on the left ankle area, normal pulses and normal venous drainage, minor trophic changes   Psychiatric:         Mood and Affect: Mood normal.         Behavior: Behavior normal.            Assessment  Pleasant 58 years old with a history of fibromyalgia IBS and progressive bilateral feet burning sensation started in the left foot that started swelled up and causing significant ankle pain with hyperalgesia allodynia specially at night then moved over to the right foot and feels like a painful ball and now bottom of the right foot.  In association to her fibromyalgia pain.  Patient has been on gabapentin for many years without much benefit.  She takes Zoloft, Failed Cymbalta caused her more depression.  Her exam today correlate with possibility of CRPS/neuropathy we will plan on left  LSP at L3 to see if that will break the cycle pain and spasm.  Will start her on IV infusion therapy as well.  Neuro supplement ordered in addition to referral to the comprehensive program           Plan  At least 50% of the visit was involved in the discussion of the options for treatment. We discussed exercises, medication, interventional therapies and surgery. Healthy life style is essential with patient hard work to achieve the wellness. In addition; discussion with the patient and/or family about any of the diagnostic results, impressions and/or recommended diagnostic studies, prognosis, risks and benefits of treatment options, instructions for treatment and/or follow-up, importance of compliance with chosen treatment options, risk-factor reduction, and patient/family education.         Pool therapy, walking in the pool, at least 3x per week for 30 minutes  Left L3 LSB after lorazepam 2 mg p.o. to give the patient a window of opportunity to improve function and decrease pain so she can participate in our full comprehensive program  IV infusion therapy  Neuro supplement ordered  Referral to acupuncture aquatic therapy and behavioral health  Healthy lifestyle and anti-inflammatory diet in addition to weight control discussed with the patient  Alternative chronic pain therapies was discussed, encouraged and information was handed  Return to Clinic 3 months       *Please note this report has been produced using speech recognition software and may contain errors related to that system including grammar, punctuation and spelling as well as words and phrases that may be inappropriate. If there are questions or concerns, please feel free to contact me to clarify.    Garrett Kilpatrick MD

## 2024-04-16 ENCOUNTER — OFFICE VISIT (OUTPATIENT)
Dept: PAIN MEDICINE | Facility: CLINIC | Age: 59
End: 2024-04-16
Payer: COMMERCIAL

## 2024-04-16 VITALS
BODY MASS INDEX: 31.08 KG/M2 | OXYGEN SATURATION: 97 % | HEART RATE: 76 BPM | WEIGHT: 175.4 LBS | TEMPERATURE: 98.8 F | HEIGHT: 63 IN | SYSTOLIC BLOOD PRESSURE: 103 MMHG | RESPIRATION RATE: 16 BRPM | DIASTOLIC BLOOD PRESSURE: 76 MMHG

## 2024-04-16 DIAGNOSIS — G62.89 OTHER POLYNEUROPATHY: Primary | ICD-10-CM

## 2024-04-16 DIAGNOSIS — M79.7 FIBROMYALGIA: ICD-10-CM

## 2024-04-16 PROCEDURE — 99204 OFFICE O/P NEW MOD 45 MIN: CPT | Performed by: ANESTHESIOLOGY

## 2024-04-16 PROCEDURE — 3008F BODY MASS INDEX DOCD: CPT | Performed by: ANESTHESIOLOGY

## 2024-04-16 PROCEDURE — 4004F PT TOBACCO SCREEN RCVD TLK: CPT | Performed by: ANESTHESIOLOGY

## 2024-04-16 PROCEDURE — 99214 OFFICE O/P EST MOD 30 MIN: CPT | Performed by: ANESTHESIOLOGY

## 2024-04-16 RX ORDER — SERTRALINE HYDROCHLORIDE 100 MG/1
TABLET, FILM COATED ORAL
COMMUNITY
Start: 2024-04-02

## 2024-04-16 RX ORDER — LORAZEPAM 2 MG/1
TABLET ORAL
Qty: 1 TABLET | Refills: 0 | Status: SHIPPED | OUTPATIENT
Start: 2024-04-16 | End: 2024-05-01 | Stop reason: ALTCHOICE

## 2024-04-16 RX ORDER — LINACLOTIDE 290 UG/1
290 CAPSULE, GELATIN COATED ORAL DAILY
COMMUNITY
Start: 2024-04-02 | End: 2024-07-01

## 2024-04-16 RX ORDER — PERPHENAZINE 16 MG
TABLET ORAL
Qty: 180 CAPSULE | Refills: 11 | Status: SHIPPED | OUTPATIENT
Start: 2024-04-16

## 2024-04-16 RX ORDER — VITAMIN B COMPLEX
1 CAPSULE ORAL 2 TIMES DAILY
Qty: 60 CAPSULE | Refills: 11 | Status: SHIPPED | OUTPATIENT
Start: 2024-04-16 | End: 2025-04-16

## 2024-04-16 RX ORDER — PERFLUOROHEXYLOCTANE 1 MG/MG
SOLUTION OPHTHALMIC
COMMUNITY
Start: 2024-04-01

## 2024-04-16 RX ORDER — ACETAMINOPHEN 160 MG/5ML
200 SUSPENSION, ORAL (FINAL DOSE FORM) ORAL 3 TIMES DAILY
Qty: 90 CAPSULE | Refills: 11 | Status: SHIPPED | OUTPATIENT
Start: 2024-04-16 | End: 2024-05-01 | Stop reason: ALTCHOICE

## 2024-04-16 SDOH — ECONOMIC STABILITY: FOOD INSECURITY: WITHIN THE PAST 12 MONTHS, THE FOOD YOU BOUGHT JUST DIDN'T LAST AND YOU DIDN'T HAVE MONEY TO GET MORE.: NEVER TRUE

## 2024-04-16 SDOH — ECONOMIC STABILITY: FOOD INSECURITY: WITHIN THE PAST 12 MONTHS, YOU WORRIED THAT YOUR FOOD WOULD RUN OUT BEFORE YOU GOT MONEY TO BUY MORE.: NEVER TRUE

## 2024-04-16 ASSESSMENT — ENCOUNTER SYMPTOMS
HEMATOLOGIC/LYMPHATIC NEGATIVE: 1
NEUROLOGICAL NEGATIVE: 1
DEPRESSION: 1
EYES NEGATIVE: 1
CARDIOVASCULAR NEGATIVE: 1
MYALGIAS: 1
RESPIRATORY NEGATIVE: 1
ARTHRALGIAS: 1
GASTROINTESTINAL NEGATIVE: 1
LOSS OF SENSATION IN FEET: 1
PSYCHIATRIC NEGATIVE: 1
ENDOCRINE NEGATIVE: 1
OCCASIONAL FEELINGS OF UNSTEADINESS: 1

## 2024-04-16 ASSESSMENT — PAIN SCALES - GENERAL
PAINLEVEL: 5
PAINLEVEL_OUTOF10: 5 - MODERATE PAIN

## 2024-04-16 ASSESSMENT — PATIENT HEALTH QUESTIONNAIRE - PHQ9
1. LITTLE INTEREST OR PLEASURE IN DOING THINGS: NOT AT ALL
SUM OF ALL RESPONSES TO PHQ9 QUESTIONS 1 AND 2: 0
2. FEELING DOWN, DEPRESSED OR HOPELESS: NOT AT ALL

## 2024-04-16 ASSESSMENT — COLUMBIA-SUICIDE SEVERITY RATING SCALE - C-SSRS
2. HAVE YOU ACTUALLY HAD ANY THOUGHTS OF KILLING YOURSELF?: NO
1. IN THE PAST MONTH, HAVE YOU WISHED YOU WERE DEAD OR WISHED YOU COULD GO TO SLEEP AND NOT WAKE UP?: NO
6. HAVE YOU EVER DONE ANYTHING, STARTED TO DO ANYTHING, OR PREPARED TO DO ANYTHING TO END YOUR LIFE?: NO

## 2024-04-16 ASSESSMENT — LIFESTYLE VARIABLES
HOW MANY STANDARD DRINKS CONTAINING ALCOHOL DO YOU HAVE ON A TYPICAL DAY: 1 OR 2
SKIP TO QUESTIONS 9-10: 1
HOW OFTEN DO YOU HAVE A DRINK CONTAINING ALCOHOL: MONTHLY OR LESS
HOW OFTEN DO YOU HAVE SIX OR MORE DRINKS ON ONE OCCASION: NEVER
AUDIT-C TOTAL SCORE: 1
TOTAL SCORE: 1

## 2024-04-16 ASSESSMENT — PAIN - FUNCTIONAL ASSESSMENT: PAIN_FUNCTIONAL_ASSESSMENT: 0-10

## 2024-04-16 ASSESSMENT — PAIN DESCRIPTION - DESCRIPTORS: DESCRIPTORS: STABBING;BURNING

## 2024-04-16 NOTE — PROGRESS NOTES
Chief Complaint   Patient presents with    New Patient Visit    Back Pain     And burning in her Feet      History of Present Complaint:  The patient was referred to us by Referring Provider: Mayank Cintron MD . this is 58 y.o.  female  with a past history of obesity BMI 30, smoker, depression, fibromyalgia, IBS, bilateral feet pain with normal EMG and no foraminal or canal stenosis in the MRI  presenting with bilateral patient describes having a burning sensation that is been ongoing for a year and a half.  Patient also has back pain the more severe pain is in her lower back that radiates down her left leg.  This back pain has been ongoing since 1 month ago.  Patient also has right shoulder pain gives it a description of having a burning sensation and itching sensation.  Ongoing for  several months.      Pain is better with nothing .  The pain is worse when walking or standing.    The pain is described as back has stabbing and popping pain and  she has bilateral feet pain that is described as burning .    Prior Pain Therapies: Prior pain physician patient would see a pain management doctor at the [facility] ., physical Therapy 0, and Injections 0    Past surgical history:   cervical surgery , right surgery , joint replacement in both thumbs and hx of right carpal tunnel , left shoulder surgery      Employment/disability/litigation:  Patient is a  actively working from home remotely states that she does a lot of sitting has gained 30 pounds.    Social history: , Has 2children, 1grandchildren and 0great-grandchildren, Finished high school, and Higher education patient did a short program in business    Diagnostic studies:  MRI of the lumbar spine     Opioid Risk Assessment Score 1/26

## 2024-04-16 NOTE — LETTER
April 16, 2024     Mayank Cintron MD  [address]  [facility] [address]    Patient: Gema High   YOB: 1965   Date of Visit: 4/16/2024       Dear Dr. Mayank Cintron MD:    Thank you for referring Gema High to me for evaluation. Below are my notes for this consultation.  If you have questions, please do not hesitate to call me. I look forward to following your patient along with you.       Sincerely,     Garrett Kilpatrick MD      CC: No Recipients  ______________________________________________________________________________________      History of Present Complaint:  The patient was referred to us by Referring Provider: Mayank Cintron MD .  Bilateral feet pain with normal EMG this is 58 y.o.  female with a past history of obesity BMI 30, smoker, depression, fibromyalgia, IBS, bilateral feet pain with normal EMG and no foraminal or canal stenosis in the MRI presenting with left more than right foot burning sensation and pain that started a year and a half ago mostly in the left ankle spread to the left foot and then now in the right foot feels the bump of the foot there is significant poker there.  Patient cannot keep her feet under the covers touching the covers make an uncomfortable she has typical signs of hyperalgesia allodynia.  There are no paralysis no saddle paresthesia no incontinence no red flags  Patient never had skin biopsy to evaluate for small fiber neuropathy      Procedures:   None    Portions of record reviewed for pertinent issues: active problem list, medication list, allergies, family history, social history, notes from last encounter, encounters, lab results, imaging and other available records.    I have personally reviewed the OAR[facility] report for this patient. This report is scanned into the electronic medical record. I have considered the risks of abuse, dependence, addiction and diversion. It showed: Adderall 15 mg x 2, gabapentin 300 mg 3  times daily from Dr. Paagn,  OPIOID RISK ASSESSMENT SCORE 1/26  Aberrant behavior: None      Diagnostic studies:  3/5/2024 bilateral lower extremity EMG were normal    2/28/2024 MRI lumbar spine minimal degenerative changes  FINDINGS:  There is a transitional lumbosacral vertebral body which is  lumbarized on the right and sacralized on the left and for the sake  of this dictation will be labeled L5.      The coronal  images demonstrate a mild dextrocurvature of the  lumbar spine.      There is minimal retrolisthesis of L1 on L2.      There are degenerative signal changes noted along endplates within  lumbar region.      The visualized spinal cord demonstrates no signal abnormality within  it.  The conus medullaris is normally positioned terminating at the  L1/2 level.      There is diminished disc signal at the L1/2 through L4/5 levels  compatible with degenerative disc disease.      At the L5/S1 level,  there is no significant spinal canal stenosis or  neuroforaminal stenosis.      At the L4/L5 level,  there is a posterior disc bulge along with  degenerative facet changes and ligamentum flavum hypertrophy  contributing to mild spinal canal narrowing. There is moderate right  and mild-to-moderate left-sided neural foraminal narrowing.      At the L3/L4 level,  there is a mild posterior disc bulge along with  degenerative facet changes and ligamentum flavum hypertrophy  contributing to mild spinal canal narrowing. There is  mild-to-moderate encroachment upon the neural foramen bilaterally.      At the L2/L3 level,  there is a posterior disc bulge along with  degenerative facet changes and ligamentum flavum hypertrophy  contributing to mild spinal canal narrowing. There is moderate  encroachment upon the neural foramen bilaterally.      At the L1/L2 level,  there is minimal retrolisthesis of L1 on L2, a  posterior disc bulge along with degenerative facet changes and  ligamentum flavum hypertrophy contributing to  mild spinal canal  narrowing. There is moderate right and mild-to-moderate left-sided  neural foraminal narrowing.      At the T12/L1 level,  there is a small central disc herniation  measuring 1 mm in AP dimension without significant spinal canal or  neural foraminal narrowing.      At the T11/12 level, there are degenerative facet changes without  significant spinal canal narrowing. There is moderate encroachment  upon the neural foramen bilaterally.      At the T10/11 level, there is a posterior disc/osteophyte complex  contributing to mild spinal canal narrowing. There are degenerative  facet changes. There is mild-to-moderate encroachment upon the neural  foramen bilaterally.          IMPRESSION:  There is a transitional lumbosacral vertebral body which is  lumbarized on the right and sacralized on the left and for the sake  of this dictation will be labeled L5.      The coronal  images demonstrate a mild dextrocurvature of the  lumbar spine.      There is minimal retrolisthesis of L1 on L2.      There is multilevel spondylosis with varying degrees of spinal canal  and neural foraminal narrowing as described above.  Data reviewed:  FINDINGS:  There is a transitional lumbosacral vertebral body which is  lumbarized on the right and sacralized on the left and for the sake  of this dictation will be labeled L5.      The coronal  images demonstrate a mild dextrocurvature of the  lumbar spine.      There is minimal retrolisthesis of L1 on L2.      There are degenerative signal changes noted along endplates within  lumbar region.      The visualized spinal cord demonstrates no signal abnormality within  it.  The conus medullaris is normally positioned terminating at the  L1/2 level.      There is diminished disc signal at the L1/2 through L4/5 levels  compatible with degenerative disc disease.      At the L5/S1 level,  there is no significant spinal canal stenosis or  neuroforaminal stenosis.      At the  L4/L5 level,  there is a posterior disc bulge along with  degenerative facet changes and ligamentum flavum hypertrophy  contributing to mild spinal canal narrowing. There is moderate right  and mild-to-moderate left-sided neural foraminal narrowing.      At the L3/L4 level,  there is a mild posterior disc bulge along with  degenerative facet changes and ligamentum flavum hypertrophy  contributing to mild spinal canal narrowing. There is  mild-to-moderate encroachment upon the neural foramen bilaterally.      At the L2/L3 level,  there is a posterior disc bulge along with  degenerative facet changes and ligamentum flavum hypertrophy  contributing to mild spinal canal narrowing. There is moderate  encroachment upon the neural foramen bilaterally.      At the L1/L2 level,  there is minimal retrolisthesis of L1 on L2, a  posterior disc bulge along with degenerative facet changes and  ligamentum flavum hypertrophy contributing to mild spinal canal  narrowing. There is moderate right and mild-to-moderate left-sided  neural foraminal narrowing.      At the T12/L1 level,  there is a small central disc herniation  measuring 1 mm in AP dimension without significant spinal canal or  neural foraminal narrowing.      At the T11/12 level, there are degenerative facet changes without  significant spinal canal narrowing. There is moderate encroachment  upon the neural foramen bilaterally.      At the T10/11 level, there is a posterior disc/osteophyte complex  contributing to mild spinal canal narrowing. There are degenerative  facet changes. There is mild-to-moderate encroachment upon the neural  foramen bilaterally.          IMPRESSION:  There is a transitional lumbosacral vertebral body which is  lumbarized on the right and sacralized on the left and for the sake  of this dictation will be labeled L5.      The coronal  images demonstrate a mild dextrocurvature of the  lumbar spine.      There is minimal retrolisthesis of L1  on L2.      There is multilevel spondylosis with varying degrees of spinal canal  and neural foraminal narrowing as described above.        Employment/disability/litigation: [employer] works remotely from home    Social History:  has 2 children 1 grandchild finished [email or web address] degree and had short classes in business, she is smoker and vapor now denies drinking or use of illicit drugs      Review of Systems   HENT: Negative.     Eyes: Negative.    Respiratory: Negative.     Cardiovascular: Negative.    Gastrointestinal: Negative.    Endocrine: Negative.    Genitourinary: Negative.    Musculoskeletal:  Positive for arthralgias and myalgias.   Skin: Negative.    Neurological: Negative.    Hematological: Negative.    Psychiatric/Behavioral: Negative.            Physical Exam  Vitals and nursing note reviewed.   Constitutional:       Appearance: Normal appearance.   HENT:      Head: Normocephalic and atraumatic.      Nose: Nose normal.   Eyes:      Extraocular Movements: Extraocular movements intact.      Conjunctiva/sclera: Conjunctivae normal.      Pupils: Pupils are equal, round, and reactive to light.   Cardiovascular:      Rate and Rhythm: Normal rate and regular rhythm.      Pulses: Normal pulses.      Heart sounds: Normal heart sounds.   Pulmonary:      Effort: Pulmonary effort is normal.      Breath sounds: Normal breath sounds.   Abdominal:      General: Abdomen is flat. Bowel sounds are normal.      Palpations: Abdomen is soft.   Skin:     General: Skin is warm.   Neurological:      General: No focal deficit present.      Mental Status: She is alert and oriented to person, place, and time.      Comments: Allodynia and hyperalgesia on the left ankle area, normal pulses and normal venous drainage, minor trophic changes   Psychiatric:         Mood and Affect: Mood normal.         Behavior: Behavior normal.            Assessment  Pleasant 58 years old with a history of fibromyalgia IBS and progressive  bilateral feet burning sensation started in the left foot that started swelled up and causing significant ankle pain with hyperalgesia allodynia specially at night then moved over to the right foot and feels like a painful ball and now bottom of the right foot.  In association to her fibromyalgia pain.  Patient has been on gabapentin for many years without much benefit.  She takes Zoloft Cymbalta caused her more depression.  Her exam today correlate with possibility of CRPS/neuropathy we will plan on left LSP at L3 to see if that will break the cycle pain and spasm.  Will start her on IV infusion therapy as well.  Neuro supplement ordered in addition to referral to the comprehensive program           Plan  At least 50% of the visit was involved in the discussion of the options for treatment. We discussed exercises, medication, interventional therapies and surgery. Healthy life style is essential with patient hard work to achieve the wellness. In addition; discussion with the patient and/or family about any of the diagnostic results, impressions and/or recommended diagnostic studies, prognosis, risks and benefits of treatment options, instructions for treatment and/or follow-up, importance of compliance with chosen treatment options, risk-factor reduction, and patient/family education.         Pool therapy, walking in the pool, at least 3x per week for 30 minutes  Left L3 LSB after lorazepam 2 mg p.o.  IV infusion therapy  Neuro supplement ordered  Referral to acupuncture aquatic therapy and behavioral health  Healthy lifestyle and anti-inflammatory diet in addition to weight control discussed with the patient  Alternative chronic pain therapies was discussed, encouraged and information was handed  Return to Clinic 3 months       *Please note this report has been produced using speech recognition software and may contain errors related to that system including grammar, punctuation and spelling as well as words and  phrases that may be inappropriate. If there are questions or concerns, please feel free to contact me to clarify.    Garrett Kilpatrick MD

## 2024-04-19 ENCOUNTER — TELEPHONE (OUTPATIENT)
Dept: INFUSION THERAPY | Facility: CLINIC | Age: 59
End: 2024-04-19
Payer: COMMERCIAL

## 2024-04-19 NOTE — TELEPHONE ENCOUNTER
Dr. Kilpatrick,    Your patient keeps calling to have Infusions, there are no orders in to schedule her, can you please put active request in so I can schedule her? Thanks Yuliya BENSON

## 2024-04-22 PROBLEM — G62.9 PERIPHERAL NEUROPATHY: Status: ACTIVE | Noted: 2024-04-22

## 2024-04-22 RX ORDER — ONDANSETRON HYDROCHLORIDE 2 MG/ML
4 INJECTION, SOLUTION INTRAVENOUS ONCE
OUTPATIENT
Start: 2024-04-22 | End: 2024-04-22

## 2024-04-22 RX ORDER — DIPHENHYDRAMINE HYDROCHLORIDE 50 MG/ML
50 INJECTION INTRAMUSCULAR; INTRAVENOUS AS NEEDED
OUTPATIENT
Start: 2024-04-22

## 2024-04-22 RX ORDER — FAMOTIDINE 10 MG/ML
20 INJECTION INTRAVENOUS ONCE AS NEEDED
OUTPATIENT
Start: 2024-04-22

## 2024-04-22 RX ORDER — EPINEPHRINE 0.3 MG/.3ML
0.3 INJECTION SUBCUTANEOUS EVERY 5 MIN PRN
OUTPATIENT
Start: 2024-04-22

## 2024-04-22 RX ORDER — NITROGLYCERIN 0.4 MG/1
0.4 TABLET SUBLINGUAL ONCE
OUTPATIENT
Start: 2024-04-22 | End: 2024-04-22

## 2024-04-22 RX ORDER — ALBUTEROL SULFATE 0.83 MG/ML
3 SOLUTION RESPIRATORY (INHALATION) AS NEEDED
OUTPATIENT
Start: 2024-04-22

## 2024-04-22 RX ORDER — KETOROLAC TROMETHAMINE 30 MG/ML
30 INJECTION, SOLUTION INTRAMUSCULAR; INTRAVENOUS ONCE
OUTPATIENT
Start: 2024-04-22 | End: 2024-04-22

## 2024-04-22 RX ORDER — METOPROLOL TARTRATE 25 MG/1
25 TABLET, FILM COATED ORAL ONCE
OUTPATIENT
Start: 2024-04-22 | End: 2024-04-22

## 2024-04-22 NOTE — TELEPHONE ENCOUNTER
Dr. Kilpatrick,     I am not aware patients don't have orders till they call me, I only schedule pain infusions, Angela Tineo and Maryjo schedule pain and procedures So I will let them know to reach out to her to get her scheduled for that. Yuliya BENSON.

## 2024-04-25 RX ORDER — LIRAGLUTIDE 6 MG/ML
3 INJECTION, SOLUTION SUBCUTANEOUS DAILY
COMMUNITY
End: 2024-05-07 | Stop reason: ALTCHOICE

## 2024-04-29 ENCOUNTER — OFFICE VISIT (OUTPATIENT)
Dept: NEUROLOGY | Facility: CLINIC | Age: 59
End: 2024-04-29
Payer: COMMERCIAL

## 2024-04-29 VITALS
TEMPERATURE: 96.8 F | RESPIRATION RATE: 16 BRPM | SYSTOLIC BLOOD PRESSURE: 124 MMHG | BODY MASS INDEX: 31.33 KG/M2 | HEIGHT: 63 IN | WEIGHT: 176.8 LBS | DIASTOLIC BLOOD PRESSURE: 82 MMHG | HEART RATE: 85 BPM

## 2024-04-29 DIAGNOSIS — M79.7 FIBROMYALGIA: ICD-10-CM

## 2024-04-29 DIAGNOSIS — M50.90 DISC DISORDER OF CERVICAL REGION: ICD-10-CM

## 2024-04-29 DIAGNOSIS — M06.09 RHEUMATOID ARTHRITIS OF MULTIPLE SITES WITH NEGATIVE RHEUMATOID FACTOR (MULTI): ICD-10-CM

## 2024-04-29 DIAGNOSIS — M79.672 FOOT PAIN, BILATERAL: Primary | ICD-10-CM

## 2024-04-29 DIAGNOSIS — F32.4 MAJOR DEPRESSIVE DISORDER WITH SINGLE EPISODE, IN PARTIAL REMISSION (CMS-HCC): ICD-10-CM

## 2024-04-29 DIAGNOSIS — R53.81 MALAISE AND FATIGUE: ICD-10-CM

## 2024-04-29 DIAGNOSIS — R53.83 MALAISE AND FATIGUE: ICD-10-CM

## 2024-04-29 DIAGNOSIS — M79.671 FOOT PAIN, BILATERAL: Primary | ICD-10-CM

## 2024-04-29 DIAGNOSIS — G62.89 OTHER POLYNEUROPATHY: ICD-10-CM

## 2024-04-29 PROCEDURE — 99215 OFFICE O/P EST HI 40 MIN: CPT | Performed by: PSYCHIATRY & NEUROLOGY

## 2024-04-29 PROCEDURE — 3008F BODY MASS INDEX DOCD: CPT | Performed by: PSYCHIATRY & NEUROLOGY

## 2024-04-29 PROCEDURE — 4004F PT TOBACCO SCREEN RCVD TLK: CPT | Performed by: PSYCHIATRY & NEUROLOGY

## 2024-04-29 ASSESSMENT — ENCOUNTER SYMPTOMS
DEPRESSION: 0
LOSS OF SENSATION IN FEET: 0
OCCASIONAL FEELINGS OF UNSTEADINESS: 0

## 2024-04-29 NOTE — PATIENT INSTRUCTIONS
The patient is still having symptoms of pain in her feet and now is endorsing symptoms of neck pain with radiation to the shoulders bilaterally.    I will obtain bilateral foot x-rays.  I would like to have a copy of the podiatry consult as well as x-rays that were done on her feet bilaterally.    I would like to have a copy of the EMG nerve conduction study that was done at [facility] several years ago for my review.  The patient does need a skin biopsy to rule out a small fiber neuropathy.  I will also order an MRI of the cervical spine and flexion-extension x-rays of the cervical spine.  The patient should continue to follow with pain management.  The patient should continue her Ajovy, Imuran, vitamin D, Neurontin, Plaquenil, Mobic, alpha lipoic acid, Adderall, and Zoloft.  The patient should continue all of her medicines and stroke risk factor modification.  The patient can use symptomatic pain medicines for severe pain.  I discussed all these issues in detail with the patient and answered all of her questions.  The patient will follow-up with me in 6 months.

## 2024-04-29 NOTE — PROGRESS NOTES
Subjective     Gema High 58 y.o.  HPI  The patient is still having foot pain that she rates at an 8 out of 10 in severity.  There has been no significant change in the character or the severity of her pain since I saw her last.  She did state that she saw pain management who started her on some supplements and she feels that the supplements have helped to some extent.  The patient did have an MRI of the lumbar spine that showed degenerative changes but no significant lumbar stenosis was noted.  The patient an EMG nerve conduction study of the lower extremities was normal.  The bone scan was denied by insurance.    The patient feels that she recently developed severe neck pain that she rates at approximately a 7-8 out of 10.  She does have pain that radiates into both shoulders and she feels that the pain is worsened when she raises her right arm or if she leans forward.      I do not have a copy of the patient's podiatry consult, foot x-rays or EMG that was done at [facility].    The patient is compliant with her Ajovy, Imuran, vitamin D, Neurontin, Plaquenil, Mobic, alpha lipoic acid, Adderall, and Zoloft.    Review of Systems   All other systems reviewed and are negative.       Patient Active Problem List   Diagnosis    Abdominal pain    Depression    Disc disorder of cervical region    Dysphagia    Esophageal reflux    Hiatal hernia    Adenomatous polyposis coli    Female pelvic pain    Fibromyalgia    Gastroparesis    Hemorrhoids    Incomplete emptying of bladder    Chronic constipation    Irritable bowel syndrome    Left lower quadrant pain    Malaise and fatigue    Genitourinary syndrome of menopause    Menopausal symptoms    Mixed stress and urge urinary incontinence    Motion sickness    OAB (overactive bladder)    Osteoporosis    Radicular pain in left arm    Rheumatoid arthritis (Multi)    Sjogrens syndrome (Multi)    Spinal stenosis    Tobacco dependence    Well woman exam    Peripheral  neuropathy        Past Medical History:   Diagnosis Date    Acute pharyngitis, unspecified 10/21/2014    Sore throat    Anxiety disorder, unspecified     Anxiety and depression    Chronic rhinitis 09/08/2015    Rhinitis    CTS (carpal tunnel syndrome) 2022    Fibroid Recurring    Furuncle of buttock 09/06/2017    Boil, buttock    Major depressive disorder, recurrent, unspecified (CMS-[facility])     Major depression, recurrent    Migraine 2008    Other malaise 07/09/2020    Malaise and fatigue    Personal history of diseases of the skin and subcutaneous tissue     History of psoriasis    Personal history of other diseases of the digestive system 11/24/2014    History of rectal bleeding    Personal history of other diseases of the musculoskeletal system and connective tissue     History of arthritis    Personal history of other diseases of the respiratory system 12/06/2021    History of acute bronchitis with bronchospasm    Pneumonia, unspecified organism 10/06/2022    Pneumonia of right lung due to infectious organism, unspecified part of lung    Rheumatoid arthritis (Multi) 1997        Past Surgical History:   Procedure Laterality Date    BREAST SURGERY  01/04/2013    Breast Surgery Reduction Procedure Bilateral    COLONOSCOPY W/ POLYPECTOMY  01/04/2013    Complete Colonoscopy For Polyp Removal    ESOPHAGOGASTRODUODENOSCOPY  01/04/2013    Diagnostic Esophagogastroduodenoscopy    HYSTEROSCOPY  02/19/2015    Hysteroscopy With Endometrial Ablation    OTHER SURGICAL HISTORY  01/04/2013    Hysteroscopy    OTHER SURGICAL HISTORY  01/04/2013    Spinal Arthrodesis For Deformity By Anterior Approach    OTHER SURGICAL HISTORY  01/04/2013    Fusion / Refusion Of Vertebrae    TUBAL LIGATION  01/04/2013    Tubal Ligation        Social History     Socioeconomic History    Marital status:      Spouse name: Not on file    Number of children: Not on file    Years of education: Not on file    Highest education level: Not on file    Occupational History    Not on file   Tobacco Use    Smoking status: Former     Current packs/day: 0.00     Average packs/day: 1 pack/day for 43.0 years (43.0 ttl pk-yrs)     Types: Cigarettes     Start date: 1982     Quit date: 2023     Years since quittin.3     Passive exposure: Past    Smokeless tobacco: Current    Tobacco comments:     Vape   Vaping Use    Vaping status: Every Day    Substances: Nicotine    Passive vaping exposure: Yes   Substance and Sexual Activity    Alcohol use: Not Currently     Comment: Socially    Drug use: Never    Sexual activity: Not Currently     Partners: Male     Birth control/protection: Post-menopausal   Other Topics Concern    Not on file   Social History Narrative    Not on file     Social Determinants of Health     Financial Resource Strain: Not on file   Food Insecurity: No Food Insecurity (2024)    Hunger Vital Sign     Worried About Running Out of Food in the Last Year: Never true     Ran Out of Food in the Last Year: Never true   Transportation Needs: Not on file   Physical Activity: Not on file   Stress: Not on file   Social Connections: Not on file   Intimate Partner Violence: Not on file   Housing Stability: Not on file        Family History   Problem Relation Name Age of Onset    Diabetes Mother Jona Adamson     Epilepsy Mother Jona Adamson         and recurrent seizures    Hypertension Mother Jona Adamson         essential htn    Other (Bronchitis) Mother Jona Adamson     Pancreatic cancer Mother Jona Adamson     Arthritis Mother Jona Adamson     Diabetes Father Mahmud Bakr     Other (cardiac disorder) Father Mahmud Bakr     Other (stroke syndrome) Father Mahmud Bakr     Other (bronchitis) Daughter          Current Outpatient Medications   Medication Instructions    Ajovy Autoinjector 225 mg/1.5 mL auto-injector Ajovy 225 MG/1.5ML Subcutaneous Solution Auto-injector  Quantity: 2   Refills: 0  Start: 25-Mar-2021    alpha lipoic acid 600 mg  "capsule 1 p.o. after meal 3 times daily x 1 week increase to 2 p.o. 3 times daily    amphetamine-dextroamphetamine (Adderall) 15 mg tablet Amphetamine-Dextroamphetamine 15 MG Oral Tablet  Quantity: 30   Refills: 0  Start: 5-Apr-2023    azaTHIOprine (IMURAN) 50 mg, oral, 2 times daily    azelastine (Astelin) 137 mcg (0.1 %) nasal spray Azelastine HCl - 0.1 % Nasal Solution  Quantity: 30   Refills: 0  Start: 1-Oct-2020    b complex vitamins capsule 1 capsule, oral, 2 times daily    coenzyme Q-10 200 mg, oral, 3 times daily    ergocalciferol (Vitamin D-2) 1.25 MG (43061 UT) capsule Vitamin D (Ergocalciferol) 1.25 MG (93340 UT) Oral Capsule  Quantity: 4   Refills: 0  Start: 22-Sep-2020    fluticasone (Flonase) 50 mcg/actuation nasal spray 2 sprays, Each Nostril, Daily    gabapentin (NEURONTIN) 600 mg, oral, Every 8 hours    hydroxychloroquine (Plaquenil) 200 mg tablet 1 tablet, oral, Daily    ketoconazole (NIZOral) 2 % shampoo Ketoconazole 2 % External Shampoo  Quantity: 120   Refills: 0  Start: 10-Dec-2020    Linzess 290 mcg, oral, Daily    LORazepam (Ativan) 2 mg tablet Take 1 p.o. 1 hour before procedure    meloxicam (MOBIC) 7.5 mg, oral, Daily    Miebo 100 % drops INSTILL 1 DROP INTO AFFECTED EYE(S) FOUR TIMES DAILY    naratriptan (AMERGE) 2.5 mg, oral, AT ONSET OF HEADACHE, MAY REPEAT ONCE IN 4 HOURS    NON FORMULARY coumpound Gabapentin and Lidocaine    pen needle, diabetic 32 gauge x 5/32\" needle Use daily with Saxenda pen    Pepcid 40 mg tablet 1 tablet, oral, 2 times daily    pilocarpine (SALAGEN) 7.5 mg, oral, 4 times daily    Saxenda 3 mg, subcutaneous, Daily    sertraline (Zoloft) 100 mg tablet TAKE 1 TABLET BY MOUTH EVERY DAY IN THE MORNING AS DIRECTED    traMADol (Ultram) 50 mg tablet traMADol HCl - 50 MG Oral Tablet  Quantity: 90   Refills: 0  Start: 12-Dec-2012        Allergies   Allergen Reactions    Adalimumab Other     Dry skin    Duloxetine Other     Depression    Escitalopram Oxalate Other     " "Vertigo    Esomeprazole Unknown    Fluoxetine Other     Could not wake up out of sleeping     Omeprazole Unknown    Pantoprazole Unknown    Topiramate Other     Depression    Venlafaxine Other     Rectal spasm    Wellbutrin [Bupropion Hcl] Agitation        Objective  /82   Pulse 85   Temp 36 °C (96.8 °F)   Resp 16   Ht 1.6 m (5' 3\")   Wt 80.2 kg (176 lb 12.8 oz)   BMI 31.32 kg/m²    GENERAL APPEARANCE:  No distress, alert and cooperative.     MENTAL STATE:  Orientation was normal to time, place and person. Recent and remote memory was intact.  Attention span and concentration were normal. Language testing was normal for comprehension, repetition, expression, and naming. Calculation was intact. The patient could correctly interpret a picture, and copy a diagram. General fund of knowledge was intact. Mini-mental status examination was performed with no errors.     CRANIAL NERVES:  Cranial nerves were normal.      CN 2- Visual Acuity  OD: 20/20 (corrected)   OS: 20/20 (corrected); visual fields full to confrontation.      CN 3, 4, 6-  Pupils round, 4 mm in diameter, equally reactive to light. No ptosis. EOMs normal alignment, full range of movement, no nystagmus     CN 5- Facial sensation intact bilaterally. Normal corneal reflexes.      CN 7- Normal and symmetric facial strength. Nasolabial folds symmetric.     CN 8- Hearing intact to finger rub, whisper.      CN 9- Palate elevates symmetrically. Normal gag reflex.      CN 11- Normal strength of shoulder shrug and neck turning      CN 12- Tongue midline, with normal bulk and strength; no fasciculations.     MOTOR:  Motor exam was normal. Muscle bulk and tone were normal in both upper and lower extremities. Muscle strength was 5/5 in distal and proximal muscles in both upper and lower extremities. No fasciculations, tremor or other abnormal movements were present.     GAIT: Station was stable with a normal base and negative Romberg sign. Gait was stable " with a normal arm swing and speed. No ataxia, shuffling, steppage or waddling was noted. Tandem gait was intact. Postural reflexes were normal.     Assessment/Plan   The patient is still having symptoms of pain in her feet and now is endorsing symptoms of neck pain with radiation to the shoulders bilaterally.    I will obtain bilateral foot x-rays.  I would like to have a copy of the podiatry consult as well as x-rays that were done on her feet bilaterally.    I would like to have a copy of the EMG nerve conduction study that was done at [facility] several years ago for my review.  The patient does need a skin biopsy to rule out a small fiber neuropathy.  I will also order an MRI of the cervical spine and flexion-extension x-rays of the cervical spine.  The patient should continue to follow with pain management.  The patient should continue her Ajovy, Imuran, vitamin D, Neurontin, Plaquenil, Mobic, alpha lipoic acid, Adderall, and Zoloft.  The patient should continue all of her medicines and stroke risk factor modification.  The patient can use symptomatic pain medicines for severe pain.  I discussed all these issues in detail with the patient and answered all of her questions.  The patient will follow-up with me in 6 months.

## 2024-04-30 ENCOUNTER — APPOINTMENT (OUTPATIENT)
Dept: PAIN MEDICINE | Facility: CLINIC | Age: 59
End: 2024-04-30
Payer: COMMERCIAL

## 2024-05-02 ENCOUNTER — TELEPHONE (OUTPATIENT)
Dept: INFUSION THERAPY | Facility: CLINIC | Age: 59
End: 2024-05-02
Payer: COMMERCIAL

## 2024-05-02 NOTE — TELEPHONE ENCOUNTER
A peer to peer was completed for Marlena High for left L4-L3 lumbar sympathetic nerve block.  After further discussion with Dr. Goodwin the sympathetic nerve block has been approved.  Authorization #[id number]  Thank you,  Marv Mae CNP

## 2024-05-07 ENCOUNTER — HOSPITAL ENCOUNTER (OUTPATIENT)
Dept: RADIOLOGY | Facility: CLINIC | Age: 59
Discharge: HOME | End: 2024-05-07
Payer: COMMERCIAL

## 2024-05-07 ENCOUNTER — HOSPITAL ENCOUNTER (OUTPATIENT)
Dept: PAIN MEDICINE | Facility: CLINIC | Age: 59
Discharge: HOME | End: 2024-05-07
Payer: COMMERCIAL

## 2024-05-07 VITALS
RESPIRATION RATE: 18 BRPM | HEART RATE: 81 BPM | OXYGEN SATURATION: 94 % | SYSTOLIC BLOOD PRESSURE: 110 MMHG | TEMPERATURE: 98.6 F | DIASTOLIC BLOOD PRESSURE: 62 MMHG

## 2024-05-07 DIAGNOSIS — G62.89 OTHER POLYNEUROPATHY: ICD-10-CM

## 2024-05-07 DIAGNOSIS — M79.7 FIBROMYALGIA: ICD-10-CM

## 2024-05-07 PROCEDURE — 2500000005 HC RX 250 GENERAL PHARMACY W/O HCPCS

## 2024-05-07 PROCEDURE — 77003 FLUOROGUIDE FOR SPINE INJECT: CPT | Performed by: ANESTHESIOLOGY

## 2024-05-07 PROCEDURE — 64520 N BLOCK LUMBAR/THORACIC: CPT | Performed by: ANESTHESIOLOGY

## 2024-05-07 PROCEDURE — 2500000004 HC RX 250 GENERAL PHARMACY W/ HCPCS (ALT 636 FOR OP/ED)

## 2024-05-07 RX ORDER — METHYLPREDNISOLONE ACETATE 80 MG/ML
INJECTION, SUSPENSION INTRA-ARTICULAR; INTRALESIONAL; INTRAMUSCULAR; SOFT TISSUE
Status: COMPLETED
Start: 2024-05-07 | End: 2024-05-07

## 2024-05-07 RX ORDER — ROPIVACAINE HYDROCHLORIDE 5 MG/ML
INJECTION, SOLUTION EPIDURAL; INFILTRATION; PERINEURAL
Status: COMPLETED
Start: 2024-05-07 | End: 2024-05-07

## 2024-05-07 RX ORDER — LIDOCAINE HYDROCHLORIDE 5 MG/ML
INJECTION, SOLUTION INFILTRATION; INTRAVENOUS
Status: COMPLETED
Start: 2024-05-07 | End: 2024-05-07

## 2024-05-07 RX ADMIN — METHYLPREDNISOLONE ACETATE 80 MG: 80 INJECTION, SUSPENSION INTRA-ARTICULAR; INTRALESIONAL; INTRAMUSCULAR; SOFT TISSUE at 15:20

## 2024-05-07 RX ADMIN — LIDOCAINE HYDROCHLORIDE 500 MG: 5 INJECTION, SOLUTION INFILTRATION at 15:20

## 2024-05-07 RX ADMIN — ROPIVACAINE HYDROCHLORIDE 100 MG: 5 INJECTION, SOLUTION EPIDURAL; INFILTRATION; PERINEURAL at 15:20

## 2024-05-07 ASSESSMENT — PAIN DESCRIPTION - DESCRIPTORS: DESCRIPTORS: ACHING

## 2024-05-07 ASSESSMENT — ENCOUNTER SYMPTOMS
DEPRESSION: 0
OCCASIONAL FEELINGS OF UNSTEADINESS: 0
LOSS OF SENSATION IN FEET: 0

## 2024-05-07 ASSESSMENT — PAIN SCALES - GENERAL: PAINLEVEL_OUTOF10: 4

## 2024-05-07 ASSESSMENT — PAIN - FUNCTIONAL ASSESSMENT: PAIN_FUNCTIONAL_ASSESSMENT: 0-10

## 2024-05-07 NOTE — OP NOTE
DATE OF PROCEDURE: PREOPERATIVE DIAGNOSIS : CRPS Left lower extremity    POST OPERATIVE DIAGNOSIS:  The Same    PROCEDURE:  Left L3  Lumbar Sympathetic Block     COMPLICATIONS: none     ESTIMATED BLOOD LOSS: None.         INDICATIONS: This patient is a pleasant 58 y.o.  female with a significant history of Neuropathic/complex regional pain syndrome of she Left   Lower extremity.  Physical exam and clinical findings correlate with pre-op diagnoses.  Because of these findings we have elected to proceed with lumbar sympathetic block.        PROCEDURE: After sufficient consent was signed, the patient was brought to the Operating Room and was placed into the prone position with a pillow underneath the hips. The back area was prepped and draped in the usual sterile fashion.     SURGICAL PROCEDURE: The L3 vertebral body was identified. The facet joint of the L3 was placed in the middle of the vertebral body under fluoroscopy guidance through oblique view. The skin at the entry site was infiltrated with lidocaine 1% with bicarbonate using size 25 gauge needle. A size 22 gauge 7 inch Chiba was introduced gradually until the L3 vertebral body was encountered. At that time under lateral view the needle was advanced gradually until the tip was placed around 2 to 3 mm anterior border of the vertebral body. Omnipaque 300 3 ml was injected and showed excellent distribution of the dye in the gutter and without any distribution of the dye intravascular or in the psoas muscle. At that time, a mixture of 20 ml of ropivacaine 0.5% and 5 ml of Omnipaque 300 + Kenalog 40/80 mg were injected under continuous fluoroscopy guidance and showed excellent distribution of the dye to the lateral aspect of the vertebral body in the gutter and covering the sympathetic chain in that area. The needle was flushed and removed.      The patient tolerated the procedure very well and was transferred to the Recovery Room in stable condition.  The patient  had stable resolution of symptoms.  Patient to follow-up in the Pain Management Clinic as scheduled.

## 2024-05-10 ENCOUNTER — TELEPHONE (OUTPATIENT)
Dept: PAIN MEDICINE | Facility: CLINIC | Age: 59
End: 2024-05-10
Payer: COMMERCIAL

## 2024-05-10 DIAGNOSIS — L29.9 ITCHING: Primary | ICD-10-CM

## 2024-05-10 RX ORDER — HYDROXYZINE HYDROCHLORIDE 10 MG/1
10 TABLET, FILM COATED ORAL EVERY 6 HOURS PRN
Qty: 120 TABLET | Refills: 3 | Status: SHIPPED | OUTPATIENT
Start: 2024-05-10 | End: 2024-09-07

## 2024-05-10 NOTE — TELEPHONE ENCOUNTER
Pt called and said she is having extreme itching all over - has been about 3 days now.   She is asking for something to help with this

## 2024-05-10 NOTE — PROGRESS NOTES
Called the patient and I discussed with her what is the the reason for her itching and the patient stated that she had this problem for many years and she takes Benadryl and multiple antihistamine without benefit.  I discussed with her hydroxyzine and I sent her to 10 mg to take and I warned her about the sleeping effect from it.  Hopefully that will help her.  It is clearly it is not an autoimmune related since the steroid did not do much for her.  The lumbar sympathetic block had helped by almost 80% with improvement in her symptoms

## 2024-05-15 ENCOUNTER — APPOINTMENT (OUTPATIENT)
Dept: INFUSION THERAPY | Facility: CLINIC | Age: 59
End: 2024-05-15
Payer: COMMERCIAL

## 2024-05-15 ENCOUNTER — APPOINTMENT (OUTPATIENT)
Dept: RADIOLOGY | Facility: HOSPITAL | Age: 59
End: 2024-05-15
Payer: COMMERCIAL

## 2024-05-28 ENCOUNTER — HOSPITAL ENCOUNTER (OUTPATIENT)
Dept: RADIOLOGY | Facility: CLINIC | Age: 59
Discharge: HOME | End: 2024-05-28
Payer: COMMERCIAL

## 2024-05-28 ENCOUNTER — HOSPITAL ENCOUNTER (OUTPATIENT)
Dept: RADIOLOGY | Facility: HOSPITAL | Age: 59
Discharge: HOME | End: 2024-05-28
Payer: COMMERCIAL

## 2024-05-28 DIAGNOSIS — M06.09 RHEUMATOID ARTHRITIS OF MULTIPLE SITES WITH NEGATIVE RHEUMATOID FACTOR (MULTI): ICD-10-CM

## 2024-05-28 DIAGNOSIS — M50.90 DISC DISORDER OF CERVICAL REGION: ICD-10-CM

## 2024-05-28 DIAGNOSIS — M79.672 FOOT PAIN, BILATERAL: ICD-10-CM

## 2024-05-28 DIAGNOSIS — M79.671 FOOT PAIN, BILATERAL: ICD-10-CM

## 2024-05-28 PROCEDURE — 72141 MRI NECK SPINE W/O DYE: CPT | Performed by: RADIOLOGY

## 2024-05-28 PROCEDURE — 72050 X-RAY EXAM NECK SPINE 4/5VWS: CPT

## 2024-05-28 PROCEDURE — 72141 MRI NECK SPINE W/O DYE: CPT

## 2024-05-28 PROCEDURE — 73630 X-RAY EXAM OF FOOT: CPT | Mod: 50

## 2024-05-31 ENCOUNTER — APPOINTMENT (OUTPATIENT)
Dept: INFUSION THERAPY | Facility: CLINIC | Age: 59
End: 2024-05-31
Payer: COMMERCIAL

## 2024-06-27 ENCOUNTER — APPOINTMENT (OUTPATIENT)
Dept: PAIN MEDICINE | Facility: CLINIC | Age: 59
End: 2024-06-27
Payer: COMMERCIAL

## 2024-06-27 VITALS
HEIGHT: 63 IN | RESPIRATION RATE: 16 BRPM | HEART RATE: 81 BPM | TEMPERATURE: 98.1 F | BODY MASS INDEX: 31.61 KG/M2 | WEIGHT: 178.4 LBS | DIASTOLIC BLOOD PRESSURE: 72 MMHG | SYSTOLIC BLOOD PRESSURE: 123 MMHG | OXYGEN SATURATION: 95 %

## 2024-06-27 DIAGNOSIS — M79.7 FIBROMYALGIA: Primary | ICD-10-CM

## 2024-06-27 PROCEDURE — 99214 OFFICE O/P EST MOD 30 MIN: CPT | Performed by: ANESTHESIOLOGY

## 2024-06-27 PROCEDURE — 4004F PT TOBACCO SCREEN RCVD TLK: CPT | Performed by: ANESTHESIOLOGY

## 2024-06-27 PROCEDURE — 3008F BODY MASS INDEX DOCD: CPT | Performed by: ANESTHESIOLOGY

## 2024-06-27 ASSESSMENT — ENCOUNTER SYMPTOMS
LOSS OF SENSATION IN FEET: 0
HEMATOLOGIC/LYMPHATIC NEGATIVE: 1
CARDIOVASCULAR NEGATIVE: 1
ARTHRALGIAS: 1
ENDOCRINE NEGATIVE: 1
GASTROINTESTINAL NEGATIVE: 1
DEPRESSION: 1
OCCASIONAL FEELINGS OF UNSTEADINESS: 0
PSYCHIATRIC NEGATIVE: 1
BACK PAIN: 1
RESPIRATORY NEGATIVE: 1
NEUROLOGICAL NEGATIVE: 1
EYES NEGATIVE: 1

## 2024-06-27 ASSESSMENT — PAIN SCALES - GENERAL: PAINLEVEL: 5

## 2024-06-27 NOTE — PROGRESS NOTES
This is 58 y.o.  female with who has been treated for Lower back pain and bilateral foot pain . Pain is worse, The pain is described as  burning  and is relieved by Lying down with who is here for follow-up   Chief Complaint   Patient presents with    Back Pain     5/10 Low back and bilateral foot pain.       Pain Therapies: Prior office visit:  4/16/2024: The patient was referred to us by Referring Provider: Mayank Cintron MD .  Bilateral feet pain with normal EMG this is 58 y.o.  female with a past history of obesity BMI 30, smoker, depression, fibromyalgia, IBS, bilateral feet pain with normal EMG and no foraminal or canal stenosis in the MRI presenting with left more than right foot burning sensation and pain that started a year and a half ago mostly in the left ankle spread to the left foot and then now in the right foot feels the bump of the foot there is significant poker there.  Patient cannot keep her feet under the covers touching the covers make an uncomfortable she has typical signs of hyperalgesia allodynia. Failed Gabapentin which she's taking TID from Dr. Pagan.There are no paralysis no saddle paresthesia no incontinence no red flags  Patient never had skin biopsy to evaluate for small fiber neuropathy

## 2024-06-27 NOTE — PROGRESS NOTES
SUBJECTIVE:  This is 58 y.o.  female with PMH of obesity BMI 30, smoker, depression, fibromyalgia, IBS, bilateral feet pain with normal EMG and no foraminal or canal stenosis in the MRI of the lumbar spine.  A new MRI on 5/29/2024 for cervical spine showed cervical fusion C3-C7 with minimal C7-T1 foraminal stenosis.  The patient has significant burning in her left foot treated with Zoloft and gabapentin without much benefit failed Cymbalta.  Patient received on 5/7/2024 L3 LSP who is here for follow-up who is back here today and she forgot that she had her injection until I reminded her that she stated that her sleep feels good but she has low back pain she has hips pain.  To me the patient has really a lot of fibromyalgia symptoms and the only thing that is going to help her is IV infusion therapy.  Patient stated that she had no family is can bring her over for the infusion.  I do not see any injection to help evaluate her heart at this time.  The supplement helped her in the beginning and now is not effective.      Prior office visit:  4/16/2024: The patient was referred to us by Referring Provider: Mayank Cintron MD .  Bilateral feet pain with normal EMG this is 58 y.o.  female with a past history of obesity BMI 30, smoker, depression, fibromyalgia, IBS, bilateral feet pain with normal EMG and no foraminal or canal stenosis in the MRI presenting with left more than right foot burning sensation and pain that started a year and a half ago mostly in the left ankle spread to the left foot and then now in the right foot feels the bump of the foot there is significant poker there.  Patient cannot keep her feet under the covers touching the covers make an uncomfortable she has typical signs of hyperalgesia allodynia. Failed Gabapentin which she's taking TID from Dr. Pagan.There are no paralysis no saddle paresthesia no incontinence no red flags  Patient never had skin biopsy to evaluate for small fiber neuropathy         Procedures:   None     Portions of record reviewed for pertinent issues: active problem list, medication list, allergies, family history, social history, notes from last encounter, encounters, lab results, imaging and other available records.     I have personally reviewed the [facility] report for this patient. This report is scanned into the electronic medical record. I have considered the risks of abuse, dependence, addiction and diversion. It showed: Adderall 15 mg x 2, gabapentin 300 mg 3 times daily from Dr. Pagan,  OPIOID RISK ASSESSMENT SCORE 1/26  Aberrant behavior: None        Diagnostic studies:  5/29/2024 MRI cervical spine showed C3-C7 anterior fusion no significant canal stenosis mild neuroforaminal stenosis at C7-T1:  FINDINGS:  There are postsurgical changes of anterior fusion and discectomy at  C3 through C7 with associated metallic susceptibility artifact.      Alignment: The vertebral alignment is within normal limits.      Vertebrae/Intervertebral Discs: The vertebral bodies demonstrate  expected height.  The marrow signal is within normal limits. There is  desiccated disc signal at C2-C3 and within the upper thoracic spine.      Cord: Normal in caliber and signal.      C1-C2: The cervicomedullary junction appears unremarkable. There is  no spinal canal stenosis.      C2-C3: No spinal canal or neural foraminal stenosis.      C3-C4: Mild disc osteophyte complex and uncovertebral joint  hypertrophy. No spinal canal stenosis. No neural foraminal stenosis.      C4-C5: No spinal canal or neural foraminal stenosis.      C5-C6: No spinal canal or neural foraminal stenosis.      C6-C7: No spinal canal or neural foraminal stenosis.      C7-T1: Mild disc osteophyte complex and uncovertebral joint  hypertrophy and facet arthrosis. No spinal canal stenosis. Mild  neural foraminal stenosis.      The upper thoracic vertebrae and spinal canal are unremarkable.      The prevertebral and posterior paraspinous soft  tissues are within  normal limits.      IMPRESSION:  Postsurgical changes of anterior fusion and discectomy at C3 through  C7.      No significant spinal canal stenosis. Mild neural foraminal stenosis  at C7-T1.      MACRO:  None      Signed by: Carmen Schaefer 5/29/2024 11:28 AM      3/5/2024 bilateral lower extremity EMG were normal     2/28/2024 MRI lumbar spine report minimal degenerative changes  FINDINGS:  There is a transitional lumbosacral vertebral body which is  lumbarized on the right and sacralized on the left and for the sake  of this dictation will be labeled L5.      The coronal  images demonstrate a mild dextrocurvature of the  lumbar spine.      There is minimal retrolisthesis of L1 on L2.      There are degenerative signal changes noted along endplates within  lumbar region.      The visualized spinal cord demonstrates no signal abnormality within  it.  The conus medullaris is normally positioned terminating at the  L1/2 level.      There is diminished disc signal at the L1/2 through L4/5 levels  compatible with degenerative disc disease.      At the L5/S1 level,  there is no significant spinal canal stenosis or  neuroforaminal stenosis.      At the L4/L5 level,  there is a posterior disc bulge along with  degenerative facet changes and ligamentum flavum hypertrophy  contributing to mild spinal canal narrowing. There is moderate right  and mild-to-moderate left-sided neural foraminal narrowing.      At the L3/L4 level,  there is a mild posterior disc bulge along with  degenerative facet changes and ligamentum flavum hypertrophy  contributing to mild spinal canal narrowing. There is  mild-to-moderate encroachment upon the neural foramen bilaterally.      At the L2/L3 level,  there is a posterior disc bulge along with  degenerative facet changes and ligamentum flavum hypertrophy  contributing to mild spinal canal narrowing. There is moderate  encroachment upon the neural foramen bilaterally.       At the L1/L2 level,  there is minimal retrolisthesis of L1 on L2, a  posterior disc bulge along with degenerative facet changes and  ligamentum flavum hypertrophy contributing to mild spinal canal  narrowing. There is moderate right and mild-to-moderate left-sided  neural foraminal narrowing.      At the T12/L1 level,  there is a small central disc herniation  measuring 1 mm in AP dimension without significant spinal canal or  neural foraminal narrowing.      At the T11/12 level, there are degenerative facet changes without  significant spinal canal narrowing. There is moderate encroachment  upon the neural foramen bilaterally.      At the T10/11 level, there is a posterior disc/osteophyte complex  contributing to mild spinal canal narrowing. There are degenerative  facet changes. There is mild-to-moderate encroachment upon the neural  foramen bilaterally.          IMPRESSION:  There is a transitional lumbosacral vertebral body which is  lumbarized on the right and sacralized on the left and for the sake  of this dictation will be labeled L5.      The coronal  images demonstrate a mild dextrocurvature of the  lumbar spine.      There is minimal retrolisthesis of L1 on L2.      There is multilevel spondylosis with varying degrees of spinal canal  and neural foraminal narrowing as described above.  Data reviewed:  FINDINGS:  There is a transitional lumbosacral vertebral body which is  lumbarized on the right and sacralized on the left and for the sake  of this dictation will be labeled L5.      The coronal  images demonstrate a mild dextrocurvature of the  lumbar spine.      There is minimal retrolisthesis of L1 on L2.      There are degenerative signal changes noted along endplates within  lumbar region.      The visualized spinal cord demonstrates no signal abnormality within  it.  The conus medullaris is normally positioned terminating at the  L1/2 level.      There is diminished disc signal at the L1/2  through L4/5 levels  compatible with degenerative disc disease.      At the L5/S1 level,  there is no significant spinal canal stenosis or  neuroforaminal stenosis.      At the L4/L5 level,  there is a posterior disc bulge along with  degenerative facet changes and ligamentum flavum hypertrophy  contributing to mild spinal canal narrowing. There is moderate right  and mild-to-moderate left-sided neural foraminal narrowing.      At the L3/L4 level,  there is a mild posterior disc bulge along with  degenerative facet changes and ligamentum flavum hypertrophy  contributing to mild spinal canal narrowing. There is  mild-to-moderate encroachment upon the neural foramen bilaterally.      At the L2/L3 level,  there is a posterior disc bulge along with  degenerative facet changes and ligamentum flavum hypertrophy  contributing to mild spinal canal narrowing. There is moderate  encroachment upon the neural foramen bilaterally.      At the L1/L2 level,  there is minimal retrolisthesis of L1 on L2, a  posterior disc bulge along with degenerative facet changes and  ligamentum flavum hypertrophy contributing to mild spinal canal  narrowing. There is moderate right and mild-to-moderate left-sided  neural foraminal narrowing.      At the T12/L1 level,  there is a small central disc herniation  measuring 1 mm in AP dimension without significant spinal canal or  neural foraminal narrowing.      At the T11/12 level, there are degenerative facet changes without  significant spinal canal narrowing. There is moderate encroachment  upon the neural foramen bilaterally.      At the T10/11 level, there is a posterior disc/osteophyte complex  contributing to mild spinal canal narrowing. There are degenerative  facet changes. There is mild-to-moderate encroachment upon the neural  foramen bilaterally.          IMPRESSION:  There is a transitional lumbosacral vertebral body which is  lumbarized on the right and sacralized on the left and for  the sake  of this dictation will be labeled L5.      The coronal  images demonstrate a mild dextrocurvature of the  lumbar spine.      There is minimal retrolisthesis of L1 on L2.      There is multilevel spondylosis with varying degrees of spinal canal  and neural foraminal narrowing as described above.         Employment/disability/litigation: [employer] works remotely from home     Social History:  has 2 children 1 grandchild finished NPM degree and had short classes in business, she is smoker and vapor now denies drinking or use of illicit drugs             Review of Systems   HENT: Negative.     Eyes: Negative.    Respiratory: Negative.     Cardiovascular: Negative.    Gastrointestinal: Negative.    Endocrine: Negative.    Genitourinary: Negative.    Musculoskeletal:  Positive for arthralgias and back pain.   Skin: Negative.    Neurological: Negative.    Hematological: Negative.    Psychiatric/Behavioral: Negative.          Physical Exam  Vitals and nursing note reviewed.   Constitutional:       Appearance: Normal appearance.   HENT:      Head: Normocephalic and atraumatic.      Nose: Nose normal.   Eyes:      Extraocular Movements: Extraocular movements intact.      Conjunctiva/sclera: Conjunctivae normal.      Pupils: Pupils are equal, round, and reactive to light.   Cardiovascular:      Rate and Rhythm: Normal rate and regular rhythm.      Pulses: Normal pulses.      Heart sounds: Normal heart sounds.   Pulmonary:      Effort: Pulmonary effort is normal.      Breath sounds: Normal breath sounds.   Abdominal:      General: Abdomen is flat. Bowel sounds are normal.      Palpations: Abdomen is soft.   Musculoskeletal:         General: Tenderness present.   Skin:     General: Skin is warm.   Neurological:      General: No focal deficit present.      Mental Status: She is alert and oriented to person, place, and time.   Psychiatric:         Mood and Affect: Mood normal.         Behavior: Behavior  normal.                      Plan  At least 50% of the visit was involved in the discussion of the options for treatment. We discussed exercises, medication, interventional therapies and surgery. Healthy life style is essential with patient hard work to achieve the wellness. In addition; discussion with the patient and/or family about any of the diagnostic results, impressions and/or recommended diagnostic studies, prognosis, risks and benefits of treatment options, instructions for treatment and/or follow-up, importance of compliance with chosen treatment options, risk-factor reduction, and patient/family education.         Pool therapy, walking in the pool, at least 3x per week for 30 minutes  Continue self-directed physical therapy  Unfortunately I do not have much to offer for her but for IV infusion therapy the patient has to find a friend or family member to bring her for it  Healthy lifestyle and anti-inflammatory diet in addition to weight control discussed with the patient  Alternative chronic pain therapies was discussed, encouraged and information was handed  Return to Clinic as needed     *Please note this report has been produced using speech recognition software and may contain errors related to that system including grammar, punctuation and spelling as well as words and phrases that may be inappropriate. If there are questions or concerns, please feel free to contact me to clarify.    Garrett Kilpatrick MD

## 2024-07-24 ENCOUNTER — APPOINTMENT (OUTPATIENT)
Dept: PRIMARY CARE | Facility: CLINIC | Age: 59
End: 2024-07-24
Payer: COMMERCIAL

## 2024-07-24 VITALS
DIASTOLIC BLOOD PRESSURE: 76 MMHG | TEMPERATURE: 97.1 F | HEART RATE: 69 BPM | WEIGHT: 177 LBS | SYSTOLIC BLOOD PRESSURE: 124 MMHG | BODY MASS INDEX: 31.36 KG/M2 | OXYGEN SATURATION: 98 % | HEIGHT: 63 IN | RESPIRATION RATE: 18 BRPM

## 2024-07-24 DIAGNOSIS — E78.5 HYPERLIPIDEMIA, UNSPECIFIED HYPERLIPIDEMIA TYPE: Primary | ICD-10-CM

## 2024-07-24 PROCEDURE — 4004F PT TOBACCO SCREEN RCVD TLK: CPT | Performed by: INTERNAL MEDICINE

## 2024-07-24 PROCEDURE — 99214 OFFICE O/P EST MOD 30 MIN: CPT | Performed by: INTERNAL MEDICINE

## 2024-07-24 PROCEDURE — 3008F BODY MASS INDEX DOCD: CPT | Performed by: INTERNAL MEDICINE

## 2024-07-24 RX ORDER — ATORVASTATIN CALCIUM 10 MG/1
10 TABLET, FILM COATED ORAL DAILY
Qty: 100 TABLET | Refills: 3 | Status: SHIPPED | OUTPATIENT
Start: 2024-07-24 | End: 2025-08-28

## 2024-07-24 ASSESSMENT — PATIENT HEALTH QUESTIONNAIRE - PHQ9
SUM OF ALL RESPONSES TO PHQ9 QUESTIONS 1 AND 2: 0
1. LITTLE INTEREST OR PLEASURE IN DOING THINGS: NOT AT ALL
2. FEELING DOWN, DEPRESSED OR HOPELESS: NOT AT ALL

## 2024-07-24 NOTE — PROGRESS NOTES
Subjective   Patient ID: Gema High is a 58 y.o. female who presents for her 4 month follow up multiple medical conditions and follow up weight loss      She is going to schedule IV infusions for her back with pain management     She had injections in the feet and she was pain free for a month and back to walking 5 miles   She has pain in her hips and feet.     She is vaping nicotine products     She is unhappy with her weight gain.  She feels uncomfortable and feels her abdomen is too small for her structure   She has been doing the FODMAP diet for 10 years      HEALTH:  PAP 2/15, 2/24- with Dr Wei   Mammo 2/17, 2/19,  ordered 10/2023   Mammo 3/22 focal asymmetry right breast and follow up was negative,  BD 6/22 T-2.2,   Colon 2015, 4/19 hyperplastic polyp, 6/22 and Q 5   Ca cardiac score 2/19 Zero    EKG 12/09, 12/18, 3/22  Urine 12/18   Flu 2012, 10/21, 12/22, 10/23 at work  TDAP 2004, 12/18  Prevnar never  Pneumovax never   Zostavax never   Shingrix recommended and will check coverage   Pfizer CVD 3/21 and 4/21 booster 10/22, 1/22   Opth- She sees regularly for dry eyes/ Sjogren's. No glaucoma, MD or HCQ toxicity   She is using Restasis OU BID and Cequa drops       Review of Systems  All systems negative except those listed in the HPI       Objective   Visit Vitals  /76 (BP Location: Left arm, Patient Position: Sitting, BP Cuff Size: Adult)   Pulse 69   Temp 36.2 °C (97.1 °F) (Skin)   Resp 18    Body mass index is 31.35 kg/m².      Physical Exam  Vitals reviewed.   Constitutional:       Appearance: Normal appearance. She is obese.   HENT:      Head: Normocephalic.      Right Ear: Tympanic membrane, ear canal and external ear normal.      Left Ear: Tympanic membrane, ear canal and external ear normal.      Nose: Nose normal.      Mouth/Throat:      Pharynx: Oropharynx is clear.   Eyes:      Conjunctiva/sclera: Conjunctivae normal.   Cardiovascular:      Rate and Rhythm: Normal rate and regular rhythm.       Pulses: Normal pulses.      Heart sounds: Normal heart sounds.   Pulmonary:      Effort: Pulmonary effort is normal.      Breath sounds: Normal breath sounds.   Abdominal:      General: Bowel sounds are normal.      Palpations: Abdomen is soft.   Musculoskeletal:         General: Normal range of motion.      Cervical back: Normal range of motion and neck supple.      Comments: tenderness to all the fibro points   Skin:     General: Skin is warm.   Neurological:      General: No focal deficit present.      Mental Status: She is alert and oriented to person, place, and time.   Psychiatric:         Mood and Affect: Mood normal.         Behavior: Behavior normal.         Thought Content: Thought content normal.         Judgment: Judgment normal.       Assessment/Plan        Follow up completed      She is  with 2 children. She is an active tobacco user     She stopped smoking in 1/2023. She is vaping nicotine.   She started smoking at age 15. Her father smoked also.   Nicotine dependence I spent more than 3 minutes counseling patient about the need for smoking cessation and how I can support efforts when patient is ready to quit. Discussed nicotine replacement therapy, Varenicline, Bupropion, hypnosis, support groups and acupuncture as potential options. Patient currently has no signs or symptoms of tobacco related disease.  (Dx code F17.2 or Z87.891)(Billing code 75100 or 17252)      Her weight is 177 pounds with BMI at 31.35. We spent 15 minutes discussing diet and exercise. The struggle of weight loss persists   She has been doing the FODMAP diet for 10 years    She lost 4 pounds with Saxenda.    Explained she is not a candidate for Adipex due to being on Adderall.  Recommend she look into a plant based/ whole foods diet   Discussed the appropriate amounts of fat, fiber and protein she needs daily     BP in normal range in office. We will continue to monitor   EKG was normal 3/2022 at  preop hand  surgery   Recommend she monitor her BP periodically and call with elevated readings      I have spent 15 min face to face with this patient discussing their cardiac risk   We discussed the patients cardiovascular risk. If needed, lifestyle modifications recommended including: behavioral therapies of nutrition choices, exercise and eliminate habits that are contributing to their cardiac risk. We agreed to a plan to decrease his cardiovascular risks. Discussed ASA. Reviewed Guidelines and approved recommendations made to patient.   The patient's 10 yr CV risk was estimated at  2.4 % 7/2024      Elevated lipids:  and HDL 66 on labs in 10/2023  Explained goal for LDL to be less than 100 and ideally less than 70   Explained she should be on a statin due to having autoimmune disease   Prescription sent in for atorvastatin 10 mg daily, possible side effects discussed with medication   CT Cardiac Score 2/19 Zero   Recommend she look into a plant based/ whole foods diet       Thyroid Bx in 9/2019 showed benign nodule: TSH 1.15 on labs in 10/2023  Thyroid Bx 2022 negative   She is following with Dr Sousa in endocrinology      Migraines -   Migraines started about 20 years ago    Continue Tramadol 50 mg daily and pilocarpine 7.5 mg daily   Continue naratriptan 2.5 mg prn onset of HA and Ajovy as directed   She saw DEVANTE Jordan in 12/2023 and continue Ajovy and naratriptan      Right submandibular gland tenderness radiating to the right ear -   The Sjogren dries the nasal passages a lot and they are raw.   Has been evaluated by allergist with all the left side face issues and nothing found     GERD -   She has a history of HH  Continue Pepcid 40 mg daily   She had no improvement with Dexilant   Avoid Reglan while taking Pristiq   She has been on Carafate and Prilosec in the past   Xray Upper GI 6/2021 showed mild to moderate tertiary contractions in the mid to distal esophagus resulting in moderate gastroesophageal  reflux. Delay in passage of contrast stomach duodenum, may represent sequela of gastro paresis     Depression is all from her stressors and pain:   Continue Adderall 15 mg daily, Trazodone 100 mg nightly and Pristiq 25 mg daily   She is seeing psych and they are doing refills, contracts and drug screen.   Discussed food satiation with SSRIs. She will be mindful of this      OAB:  Previously failed Trospium due to experiencing adverse side effects and both Gemtesa and Myrbetriq were cost-prohibitive.   - Cystoscopy + intradetrusor Botox injection of 100 U @ 4 sites 5/2023. Tolerated the procedure well with normal cystoscopy findings: Normal bladder mucosa. No stones, masses, or tumors seen.   She saw Dr. Fuentes in 5/2023      IBS and Constipation -    She is doing a FODMAP diet   Hemorrhoids were removed with Dr Toney nonsurgically.  Continue Linzess 145 mg a day. This helps the constipation but not the bloating   Colon 6/2022 and Q 5   She saw GI in 11/2023      Seronegative RA and Sjogrens - On exam: tenderness to all the fibro points 7/2024   She restarted MTX in 2022 due to her arthritis being so bad   The main cause of her fatigue, muscle pain, GI issues and insomnia is Fibromyalgia   She takes trazodone for the sleep issues.   Coldness and tingling of hands - Worse right hand. This could be nerve impingement.   Continue Imuran 50 mg BID   Continue  mg daily and pilocarpine 7.5 mg daily  Continue gabapentin 600 mg 2 tablets Q 8 hours   She is following [facility]ology at [facility]   Recommend she look into somatic tracking      Cervical disc d/o:  S/p C spine fusion and some severe nerve pain in her neck and jaw   X-rays all negative.   She failed pain management and the EMG do not show any radiculopathy  No surgery for her hands despite the numbness, pain and coldness   some atrophy right thenar region so concerned there is some innervation in the hand      Mild spinal scoliosis, turning to the  right-  She sees pain Management and plans to get nerve blocks soon   She has Tramadol but mostly takes it when she has knee pain   - s/p Lt L3 lumbar sympathetic block on 5/7/2024  She saw Dr Kilpatrick in 6/2024  -She is going to schedule IV infusions for her back       Rotary cuff tear surgery 11/8/17. She still has a lot of pain in the left shoulder.   This affects the numbness and pain down the left arm when working      LLE  pain: Warned patient nicotine is a vasoconstrictor. She needs to quit smoking.   Bunion on right foot and arthritis outer left foot   Avoid walking barefooted and wear good shoes   She is following with podiatry and last visit 10/2023  Continue gabapentin 600 mg 2 tablets Q 8 hours    EMG studies 3/2024 were normal   MRI C/S 5/24 Postsurgical changes of anterior fusion and discectomy at C3 through C7. No significant spinal canal stenosis. Mild neural foraminal stenosis at C7-T1.  MR L/S 2/2024 There is a transitional lumbosacral vertebral body which is  lumbarized on the right and sacralized on the left and for the sake of this dictation will be labeled L5. The coronal  images demonstrate a mild dextrocurvature of the lumbar spine. There is minimal retrolisthesis of L1 on L2. There is multilevel spondylosis with varying degrees of spinal canal and neural foraminal narrowing   She saw Dr Cintron in 4/2024 : The patient does need a skin biopsy to rule out a small fiber neuropathy.     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     Mammo 3/2022 was focal asymmetry right breast and follow up was negative.  Orders placed for mammogram 10/2023  Breast exam  normal 10/2023     BD 6/2022 -2.2 L/S and hip -2.0 at [facility]. Continue OTC Calcium 500 mg BID, OTC Vitamin D 2000 UT daily and eat 2 servings of calcium enriched foods daily.   Discussed importance of weight bearing exercises   Colonoscopy 6/2022 and Q 5      Ophth  She sees regularly for dry eyes/ Sjogren's. No glaucoma, MD or HCQ toxicity.   She is using Restasis OU BID and Cequa drops   She will have her next eye exam faxed to my office in order to update her medical records.      Flu 2012, 10/21, 12/22, 10/23 at work  TDAP 2004, 12/18  Prevnar never  Pneumovax never   Zostavax never   Shingrix recommended and will check coverage   Pfizer CVD 3/21 and 4/21 booster 10/22, 1/22      Some elements in the chart were copied from Dr. Diaz's last office visit with patient.   Notes have been updated where appropriate, and reflect my current medical decision making from today.      Return in 4 months for follow up or sooner if needed  (CPE due 10/2024)       Scribe Attestation  By signing my name below, I, Kimberly Jaren , Scribe   attest that this documentation has been prepared under the direction and in the presence of Mariana Diaz MD

## 2024-07-24 NOTE — PATIENT INSTRUCTIONS
Obesity is a chronic, relapsing, progressive, treatable, multifactorial, neurobehavioral disease, where in an increase in body fat promotes adipose (fat) tissue dysfunction, as well as biomechanical stress on the body which can result in adverse metabolic, biomechanical, and psychosocial health consequences, in addition to reducing quality of life and lifespan.   Without treatment, obesity is likely to progress and worsen, further increase the patient's risk for numerous health conditions caused by excess adiposity and increasing the risk for premature death.     - Counseling provided on impact of diet, physical activity, stress & sleep in treatment of obesity.    - Counseled on reduced calorie, high protein, high fiber, whole foods diet.  Counseling on benefits of avoidance of frequent intake of processed foods and liquid calories.   - Counseled on behavioral modification strategies and tools to support weight loss.   - Reviewed pathophysiology of obesity in context of relationship to nutrition, energy balance and environmental factors that influence nutrition and energy balance outcomes.  - Counseled on various behavioral strategies and self monitoring practices to enhance understanding and individual assessment of energy balance, how various changes in types of foods consumed and macronutrient distribution can impact appetite regulation and be used as a tool in treatment of obesity.

## 2024-08-12 ENCOUNTER — APPOINTMENT (OUTPATIENT)
Dept: NEUROLOGY | Facility: CLINIC | Age: 59
End: 2024-08-12
Payer: COMMERCIAL

## 2024-09-02 DIAGNOSIS — L29.9 ITCHING: ICD-10-CM

## 2024-09-03 RX ORDER — HYDROXYZINE HYDROCHLORIDE 10 MG/1
TABLET, FILM COATED ORAL
Qty: 360 TABLET | Refills: 1 | Status: SHIPPED | OUTPATIENT
Start: 2024-09-03

## 2024-09-12 ENCOUNTER — HOSPITAL ENCOUNTER (OUTPATIENT)
Dept: RADIOLOGY | Facility: CLINIC | Age: 59
Discharge: HOME | End: 2024-09-12
Payer: COMMERCIAL

## 2024-09-12 VITALS — HEIGHT: 63 IN | WEIGHT: 175 LBS | BODY MASS INDEX: 31.01 KG/M2

## 2024-09-12 DIAGNOSIS — Z12.31 BREAST CANCER SCREENING BY MAMMOGRAM: ICD-10-CM

## 2024-09-12 PROCEDURE — 77067 SCR MAMMO BI INCL CAD: CPT

## 2024-09-17 ENCOUNTER — HOSPITAL ENCOUNTER (OUTPATIENT)
Dept: RADIOLOGY | Facility: EXTERNAL LOCATION | Age: 59
Discharge: HOME | End: 2024-09-17

## 2024-09-26 DIAGNOSIS — M79.7 FIBROMYALGIA: Primary | ICD-10-CM

## 2024-09-26 DIAGNOSIS — G62.89 OTHER POLYNEUROPATHY: ICD-10-CM

## 2024-09-26 RX ORDER — KETAMINE HCL IN NACL, ISO-OSM 100MG/10ML
SYRINGE (ML) INJECTION ONCE
OUTPATIENT
Start: 2024-09-30

## 2024-09-26 RX ORDER — ALBUTEROL SULFATE 0.83 MG/ML
3 SOLUTION RESPIRATORY (INHALATION) AS NEEDED
OUTPATIENT
Start: 2024-09-30

## 2024-09-26 RX ORDER — FAMOTIDINE 10 MG/ML
20 INJECTION INTRAVENOUS ONCE AS NEEDED
OUTPATIENT
Start: 2024-09-30

## 2024-09-26 RX ORDER — EPINEPHRINE 0.3 MG/.3ML
0.3 INJECTION SUBCUTANEOUS EVERY 5 MIN PRN
OUTPATIENT
Start: 2024-09-30

## 2024-09-26 RX ORDER — KETOROLAC TROMETHAMINE 30 MG/ML
30 INJECTION, SOLUTION INTRAMUSCULAR; INTRAVENOUS ONCE
OUTPATIENT
Start: 2024-09-30 | End: 2024-09-30

## 2024-09-26 RX ORDER — DIPHENHYDRAMINE HYDROCHLORIDE 50 MG/ML
50 INJECTION INTRAMUSCULAR; INTRAVENOUS AS NEEDED
OUTPATIENT
Start: 2024-09-30

## 2024-09-30 ENCOUNTER — INFUSION (OUTPATIENT)
Dept: INFUSION THERAPY | Facility: CLINIC | Age: 59
End: 2024-09-30
Payer: COMMERCIAL

## 2024-09-30 VITALS
DIASTOLIC BLOOD PRESSURE: 72 MMHG | SYSTOLIC BLOOD PRESSURE: 124 MMHG | TEMPERATURE: 97.3 F | RESPIRATION RATE: 15 BRPM | OXYGEN SATURATION: 94 % | HEART RATE: 71 BPM

## 2024-09-30 DIAGNOSIS — M79.7 FIBROMYALGIA: ICD-10-CM

## 2024-09-30 DIAGNOSIS — G62.89 OTHER POLYNEUROPATHY: ICD-10-CM

## 2024-09-30 PROCEDURE — 2500000004 HC RX 250 GENERAL PHARMACY W/ HCPCS (ALT 636 FOR OP/ED): Performed by: NURSE PRACTITIONER

## 2024-09-30 PROCEDURE — 96365 THER/PROPH/DIAG IV INF INIT: CPT | Mod: INF

## 2024-09-30 PROCEDURE — 96368 THER/DIAG CONCURRENT INF: CPT | Mod: INF

## 2024-09-30 PROCEDURE — 96375 TX/PRO/DX INJ NEW DRUG ADDON: CPT | Mod: INF

## 2024-09-30 PROCEDURE — 2500000005 HC RX 250 GENERAL PHARMACY W/O HCPCS: Performed by: NURSE PRACTITIONER

## 2024-09-30 RX ORDER — DIPHENHYDRAMINE HYDROCHLORIDE 50 MG/ML
50 INJECTION INTRAMUSCULAR; INTRAVENOUS AS NEEDED
OUTPATIENT
Start: 2024-10-14

## 2024-09-30 RX ORDER — ONDANSETRON HYDROCHLORIDE 2 MG/ML
4 INJECTION, SOLUTION INTRAVENOUS ONCE
OUTPATIENT
Start: 2024-10-14 | End: 2024-10-14

## 2024-09-30 RX ORDER — ALBUTEROL SULFATE 0.83 MG/ML
3 SOLUTION RESPIRATORY (INHALATION) AS NEEDED
OUTPATIENT
Start: 2024-10-14

## 2024-09-30 RX ORDER — METOPROLOL TARTRATE 25 MG/1
25 TABLET, FILM COATED ORAL ONCE
OUTPATIENT
Start: 2024-10-14 | End: 2024-10-14

## 2024-09-30 RX ORDER — KETAMINE HCL IN NACL, ISO-OSM 100MG/10ML
SYRINGE (ML) INJECTION ONCE
Status: COMPLETED | OUTPATIENT
Start: 2024-09-30 | End: 2024-09-30

## 2024-09-30 RX ORDER — FAMOTIDINE 10 MG/ML
20 INJECTION INTRAVENOUS ONCE AS NEEDED
OUTPATIENT
Start: 2024-10-14

## 2024-09-30 RX ORDER — EPINEPHRINE 0.3 MG/.3ML
0.3 INJECTION SUBCUTANEOUS EVERY 5 MIN PRN
OUTPATIENT
Start: 2024-10-14

## 2024-09-30 RX ORDER — KETAMINE HCL IN NACL, ISO-OSM 100MG/10ML
SYRINGE (ML) INJECTION ONCE
OUTPATIENT
Start: 2024-10-14

## 2024-09-30 RX ORDER — KETOROLAC TROMETHAMINE 30 MG/ML
30 INJECTION, SOLUTION INTRAMUSCULAR; INTRAVENOUS ONCE
OUTPATIENT
Start: 2024-10-14 | End: 2024-10-14

## 2024-09-30 RX ORDER — NITROGLYCERIN 0.4 MG/1
0.4 TABLET SUBLINGUAL ONCE
OUTPATIENT
Start: 2024-10-14 | End: 2024-10-14

## 2024-09-30 RX ORDER — KETOROLAC TROMETHAMINE 30 MG/ML
30 INJECTION, SOLUTION INTRAMUSCULAR; INTRAVENOUS ONCE
Status: COMPLETED | OUTPATIENT
Start: 2024-09-30 | End: 2024-09-30

## 2024-09-30 ASSESSMENT — PAIN SCALES - GENERAL
PAINLEVEL_OUTOF10: 6
PAINLEVEL_OUTOF10: 5 - MODERATE PAIN

## 2024-09-30 ASSESSMENT — ENCOUNTER SYMPTOMS
DEPRESSION: 1
LOSS OF SENSATION IN FEET: 0
OCCASIONAL FEELINGS OF UNSTEADINESS: 1

## 2024-09-30 NOTE — PROGRESS NOTES
S: Patient here for 1st opioid sparing pain infusion.     Purpose of pain infusion meds explained along with potential side effects.  Patient verbalized understanding.    B: Pain Issues in feet, knees and left hip    A: Patient currently has pain described on flow sheet documentation. Designated  is Konstantin High at [phone]. Patient last ate solid food 12 hours ago, and had liquid 1 hours ago.    R: Plan; Obtain IV access, do patient risk assessment, and start opioid sparing infusion as ordered. Monitoring for S/S of adverse reactions.    0912-Post infusion teaching provided. Patient verbalized understanding. VSS, Patient states pain is 5. Will assist patient to waiting car via wheelchair.

## 2024-09-30 NOTE — PATIENT INSTRUCTIONS
Today :We administered ketamine 30 mg-lidocaine 300 mg, propofol, and ketorolac.     For:   1. Fibromyalgia    2. Other polyneuropathy            (Tell all doctors including dentists that you are taking this medication)     Go to the emergency room or call 911 if:  -You have signs of allergic reaction:   -Rash, hives, itching.   -Swollen, blistered, peeling skin.   -Swelling of face, lips, mouth, tongue or throat.   -Tightness of chest, trouble breathing, swallowing or talking     Call your doctor:  - If IV / injection site gets red, warm, swollen, itchy or leaks fluid or pus.     (Leave dressing on your IV site for at least 2 hours and keep area clean and dry  - If you get sick or have symptoms of infection or are not feeling well for any reason.    (Wash your hands often, stay away from people who are sick)  - If you have side effects from your medication that do not go away or are bothersome.     (Refer to the teaching your nurse gave you for side effects to call your doctor about)    - Common side effects may include:  stuffy nose, headache, feeling tired, muscle aches, upset stomach  - Before receiving any vaccines     - Call the [facility] at   If:  - You get sick, are on antibiotics, have had a recent vaccine, have surgery or dental work and your doctor wants your visit rescheduled.  - You need to cancel and reschedule your visit for any reason. Call at least 2 days before your visit if you need to cancel.   - Your insurance changes before your next visit.    (We will need to get approval from your new insurance. This can take up to two weeks.)     The [facility] is opened Monday thru Friday. We are closed on weekends and holidays.   Voice mail will take your call if the center is closed. If you leave a message please allow 24 hours for a call back during weekdays. If you leave a message on a weekend/holiday, we will call you back the next business day.              [facility]  [facility]      Pain Infusion Aftercare Instructions      1. It is normal to feel sedated, tired and low in energy after a pain infusion. DO NOT DRIVE, OPERATE ANY MACHINERY, OR MAKE ANY IMPORTANT DECISIONS FOR AT LEAST 24 HOURS AFTER THE INFUSION.     2. Call the pain center at [phone] with any problems, questions, or concerns.     3. Eat light after the infusion. If you feel queasy or sick to your stomach, laying down with your eyes closed may help. When you resume eating start with something mild like clear liquids, yogurt, applesauce, crackers, etc… Gradually advance to a regular diet.     4. Do not leave your house alone the evening of your pain infusion.     5. No alcohol or sedative medications, such as sleeping pills, for 24 hours after your pain infusion.     6. Resume all other prescribed medications unless directed otherwise by you physician.     7. If you have any medical emergencies, call 911 or go directly to the closest emergency room.

## 2024-09-30 NOTE — PROGRESS NOTES
Subjective     History of Present Illness:   Gema High is a 59 y.o. female who presents to GI clinic for IBS constipation and bloating.  The main reason for referral is actually abdominal bloating.  She has been following with a primary GI doctor at [facility] who referred her to [facility] for specialized bloating treatment and she could not get an appointment because they refused to give her 1 so she decided to see me.  She has had bloating for a very long time now.  She said that over the last 10 years she has gained more than 40 pounds of weight and she does not know why.  She has been battling with bloating and early satiety for a very long time.  She said she is always full and she is miserable when she eats.  She gets significant abdominal bloating after she eats.  She has been treated with constipation treatment with Linzess mainly which has helped her constipation but not her bloating.  She feels bloated all the time.  Sometimes even if she does not eat.  She has had endoscopy colonoscopy and CT scans in the past.  She has tried multiple medications including Elavil, probiotics, with no improvement.  She has tried a low FODMAP diet for a very long time.  She said she had a gastric emptying test that was borderline positive but she tried a couple medication which did not help.      She had a colonoscopy in 2022 with 1 polyp removed as well as diverticulosis and hemorrhoids.        Review of Systems  Review of Systems   Constitutional: Negative.    HENT: Negative.     Eyes: Negative.    Respiratory: Negative.     Cardiovascular: Negative.    Gastrointestinal:         As in HPI    Endocrine: Negative.    Genitourinary: Negative.    Musculoskeletal: Negative.    Skin: Negative.    Neurological: Negative.    Psychiatric/Behavioral: Negative.         Past Medical History   has a past medical history of Acute pharyngitis, unspecified (10/21/2014), Anxiety disorder, unspecified, Chronic rhinitis  (09/08/2015), CTS (carpal tunnel syndrome) (2022), Fibrocystic breast (1989), Fibroid (Recurring), Furuncle of buttock (09/06/2017), Major depressive disorder, recurrent, unspecified (CMS-HCC), Migraine (2008), Other malaise (07/09/2020), Personal history of diseases of the skin and subcutaneous tissue, Personal history of other diseases of the digestive system (11/24/2014), Personal history of other diseases of the musculoskeletal system and connective tissue, Personal history of other diseases of the respiratory system (12/06/2021), Pneumonia, unspecified organism (10/06/2022), and Rheumatoid arthritis (Multi) (1997).     Social History   reports that she quit smoking about 20 months ago. Her smoking use included cigarettes. She started smoking about 42 years ago. She has a 43 pack-year smoking history. She has been exposed to tobacco smoke. She uses smokeless tobacco. She reports that she does not currently use alcohol. She reports that she does not use drugs.     Family History  family history includes Arthritis in her mother; Breast cancer (age of onset: 50 - 59) in her father's sister; Bronchitis in her mother; Diabetes in her father and mother; Epilepsy in her mother; Hypertension in her mother; Pancreatic cancer in her mother; bronchitis in her daughter; cardiac disorder in her father; stroke syndrome in her father.     Allergies  Allergies   Allergen Reactions    Adalimumab Other     Dry skin    Duloxetine Other     Depression    Escitalopram Oxalate Other     Vertigo    Esomeprazole Unknown    Fluoxetine Other     Could not wake up out of sleeping     Omeprazole Unknown    Pantoprazole Unknown    Topiramate Other     Depression    Venlafaxine Other     Rectal spasm    Wellbutrin [Bupropion Hcl] Agitation       Medications  Current Outpatient Medications   Medication Instructions    Ajovy Autoinjector 225 mg/1.5 mL auto-injector Ajovy 225 MG/1.5ML Subcutaneous Solution Auto-injector  Quantity: 2   Refills:  0  Start: 25-Mar-2021    alpha lipoic acid 600 mg capsule 1 p.o. after meal 3 times daily x 1 week increase to 2 p.o. 3 times daily    amphetamine-dextroamphetamine (Adderall) 15 mg tablet Amphetamine-Dextroamphetamine 15 MG Oral Tablet  Quantity: 30   Refills: 0  Start: 5-Apr-2023    atorvastatin (LIPITOR) 10 mg, oral, Daily    azaTHIOprine (IMURAN) 50 mg, oral, 2 times daily    azelastine (Astelin) 137 mcg (0.1 %) nasal spray Azelastine HCl - 0.1 % Nasal Solution  Quantity: 30   Refills: 0  Start: 1-Oct-2020    b complex vitamins capsule 1 capsule, oral, 2 times daily    ergocalciferol (Vitamin D-2) 1.25 MG (14176 UT) capsule Vitamin D (Ergocalciferol) 1.25 MG (36839 UT) Oral Capsule  Quantity: 4   Refills: 0  Start: 22-Sep-2020    fluticasone (Flonase) 50 mcg/actuation nasal spray 2 sprays, Each Nostril, Daily    gabapentin (NEURONTIN) 600 mg, oral, Every 8 hours    hydroxychloroquine (Plaquenil) 200 mg tablet 1 tablet, oral, Daily    hydrOXYzine HCL (Atarax) 10 mg tablet TAKE 1 TABLET BY MOUTH EVERY 6 HOURS IF NEEDED FOR ITCHING.    ketoconazole (NIZOral) 2 % shampoo Ketoconazole 2 % External Shampoo  Quantity: 120   Refills: 0  Start: 10-Dec-2020    Linzess 290 mcg, oral, Daily    meloxicam (MOBIC) 7.5 mg, oral, Daily    Miebo 100 % drops INSTILL 1 DROP INTO AFFECTED EYE(S) FOUR TIMES DAILY    naratriptan (AMERGE) 2.5 mg, oral, AT ONSET OF HEADACHE, MAY REPEAT ONCE IN 4 HOURS    NON FORMULARY coumpound Gabapentin and Lidocaine    Pepcid 40 mg tablet 1 tablet, oral, 2 times daily    pilocarpine (SALAGEN) 7.5 mg, oral, 4 times daily    sertraline (Zoloft) 100 mg tablet TAKE 1 TABLET BY MOUTH EVERY DAY IN THE MORNING AS DIRECTED    traMADol (Ultram) 50 mg tablet traMADol HCl - 50 MG Oral Tablet  Quantity: 90   Refills: 0  Start: 12-Dec-2012       Objective   There were no vitals taken for this visit.  Physical Exam  Vitals reviewed.   Constitutional:       Appearance: Normal appearance. She is obese.   HENT:       Head: Normocephalic.   Eyes:      Conjunctiva/sclera: Conjunctivae normal.      Pupils: Pupils are equal, round, and reactive to light.   Cardiovascular:      Rate and Rhythm: Normal rate and regular rhythm.   Pulmonary:      Effort: Pulmonary effort is normal.      Breath sounds: Normal breath sounds.   Abdominal:      General: Abdomen is flat. Bowel sounds are normal. There is no distension.      Palpations: Abdomen is soft.      Tenderness: There is no abdominal tenderness. There is no guarding or rebound.   Musculoskeletal:         General: Normal range of motion.   Skin:     General: Skin is warm and dry.   Neurological:      General: No focal deficit present.      Mental Status: She is alert and oriented to person, place, and time.                 Assessment/Plan   Gema High is a 59 y.o. female who presents to GI clinic for abdominal bloating.  Chronic abdominal bloating.  I did explain to her that I do not think that the abdominal bloating is definitely related to the weight gain.  It is probably related to IBS.  The only thing that she has not had is hydrogen breath test I will obtain this.  I did recommend that she consider something such as imipramine which could help with the bloating.  She is very unwilling to try any medications that could even cause any possible weight gain.  I also have recommended she sees endocrinology for hormone evaluation to rule out things such as Cushing syndrome which could be causing her weight gain as well especially that she has central obesity.  .  In the future could also consider repeating a gastric emptying test and if she does have gastroparesis can consider her for gastroparesis treatment however her main symptoms are the bloating rather than nausea and vomiting.          Carlitos Metzger MD

## 2024-10-01 ENCOUNTER — APPOINTMENT (OUTPATIENT)
Dept: GASTROENTEROLOGY | Facility: CLINIC | Age: 59
End: 2024-10-01
Payer: COMMERCIAL

## 2024-10-01 VITALS
SYSTOLIC BLOOD PRESSURE: 145 MMHG | HEART RATE: 83 BPM | BODY MASS INDEX: 32 KG/M2 | WEIGHT: 180.6 LBS | HEIGHT: 63 IN | DIASTOLIC BLOOD PRESSURE: 79 MMHG

## 2024-10-01 DIAGNOSIS — R14.0 ABDOMINAL BLOATING: ICD-10-CM

## 2024-10-01 DIAGNOSIS — R10.30 LOWER ABDOMINAL PAIN: Primary | ICD-10-CM

## 2024-10-01 PROCEDURE — 99204 OFFICE O/P NEW MOD 45 MIN: CPT | Performed by: INTERNAL MEDICINE

## 2024-10-01 PROCEDURE — 3008F BODY MASS INDEX DOCD: CPT | Performed by: INTERNAL MEDICINE

## 2024-10-01 PROCEDURE — 1036F TOBACCO NON-USER: CPT | Performed by: INTERNAL MEDICINE

## 2024-10-01 ASSESSMENT — ENCOUNTER SYMPTOMS
CARDIOVASCULAR NEGATIVE: 1
NEUROLOGICAL NEGATIVE: 1
RESPIRATORY NEGATIVE: 1
MUSCULOSKELETAL NEGATIVE: 1
PSYCHIATRIC NEGATIVE: 1
EYES NEGATIVE: 1
ROS GI COMMENTS: AS IN HPI
ENDOCRINE NEGATIVE: 1
CONSTITUTIONAL NEGATIVE: 1

## 2024-10-02 ENCOUNTER — TELEPHONE (OUTPATIENT)
Dept: PRIMARY CARE | Facility: CLINIC | Age: 59
End: 2024-10-02
Payer: COMMERCIAL

## 2024-10-02 DIAGNOSIS — R53.81 MALAISE AND FATIGUE: ICD-10-CM

## 2024-10-02 DIAGNOSIS — M35.00 SJOGREN'S SYNDROME, WITH UNSPECIFIED ORGAN INVOLVEMENT (MULTI): ICD-10-CM

## 2024-10-02 DIAGNOSIS — Z00.00 HEALTHCARE MAINTENANCE: ICD-10-CM

## 2024-10-02 DIAGNOSIS — K31.84 GASTROPARESIS: Primary | ICD-10-CM

## 2024-10-02 DIAGNOSIS — D64.9 ANEMIA, UNSPECIFIED TYPE: ICD-10-CM

## 2024-10-02 DIAGNOSIS — R53.83 MALAISE AND FATIGUE: ICD-10-CM

## 2024-10-02 NOTE — TELEPHONE ENCOUNTER
Patient requested that her labs be faxed to Dr. Zeng [phone]   Labs faxed to Dr. Zeng today from 10/2023)    Patient also requesting current lab order would like it to include cortisol level    Please advise

## 2024-10-02 NOTE — TELEPHONE ENCOUNTER
I faxed lab results already     Spoke to patient advised that new orders are in system.    Patient will get labs done

## 2024-10-05 ENCOUNTER — LAB (OUTPATIENT)
Dept: LAB | Facility: LAB | Age: 59
End: 2024-10-05
Payer: COMMERCIAL

## 2024-10-05 DIAGNOSIS — D64.9 ANEMIA, UNSPECIFIED TYPE: ICD-10-CM

## 2024-10-05 DIAGNOSIS — M35.00 SJOGREN'S SYNDROME, WITH UNSPECIFIED ORGAN INVOLVEMENT (MULTI): ICD-10-CM

## 2024-10-05 DIAGNOSIS — R53.81 MALAISE AND FATIGUE: ICD-10-CM

## 2024-10-05 DIAGNOSIS — R53.83 MALAISE AND FATIGUE: ICD-10-CM

## 2024-10-05 DIAGNOSIS — Z00.00 HEALTHCARE MAINTENANCE: ICD-10-CM

## 2024-10-05 LAB
25(OH)D3 SERPL-MCNC: 75 NG/ML (ref 30–100)
ALBUMIN SERPL BCP-MCNC: 4.5 G/DL (ref 3.4–5)
ALP SERPL-CCNC: 67 U/L (ref 33–110)
ALT SERPL W P-5'-P-CCNC: 15 U/L (ref 7–45)
ANION GAP SERPL CALC-SCNC: 13 MMOL/L (ref 10–20)
AST SERPL W P-5'-P-CCNC: 14 U/L (ref 9–39)
BASOPHILS # BLD AUTO: 0.05 X10*3/UL (ref 0–0.1)
BASOPHILS NFR BLD AUTO: 0.7 %
BILIRUB SERPL-MCNC: 0.5 MG/DL (ref 0–1.2)
BUN SERPL-MCNC: 15 MG/DL (ref 6–23)
CALCIUM SERPL-MCNC: 9.2 MG/DL (ref 8.6–10.3)
CHLORIDE SERPL-SCNC: 104 MMOL/L (ref 98–107)
CHOLEST SERPL-MCNC: 192 MG/DL (ref 0–199)
CHOLESTEROL/HDL RATIO: 2.8
CO2 SERPL-SCNC: 26 MMOL/L (ref 21–32)
CORTIS SERPL-MCNC: 4.3 UG/DL (ref 2.5–20)
CREAT SERPL-MCNC: 0.6 MG/DL (ref 0.5–1.05)
EGFRCR SERPLBLD CKD-EPI 2021: >90 ML/MIN/1.73M*2
EOSINOPHIL # BLD AUTO: 0.12 X10*3/UL (ref 0–0.7)
EOSINOPHIL NFR BLD AUTO: 1.6 %
ERYTHROCYTE [DISTWIDTH] IN BLOOD BY AUTOMATED COUNT: 13.3 % (ref 11.5–14.5)
GLUCOSE SERPL-MCNC: 96 MG/DL (ref 74–99)
HCT VFR BLD AUTO: 39.6 % (ref 36–46)
HDLC SERPL-MCNC: 69.1 MG/DL
HGB BLD-MCNC: 13 G/DL (ref 12–16)
IMM GRANULOCYTES # BLD AUTO: 0.02 X10*3/UL (ref 0–0.7)
IMM GRANULOCYTES NFR BLD AUTO: 0.3 % (ref 0–0.9)
IRON SATN MFR SERPL: 14 % (ref 25–45)
IRON SERPL-MCNC: 58 UG/DL (ref 35–150)
LDLC SERPL CALC-MCNC: 107 MG/DL
LYMPHOCYTES # BLD AUTO: 2.29 X10*3/UL (ref 1.2–4.8)
LYMPHOCYTES NFR BLD AUTO: 30 %
MCH RBC QN AUTO: 31.8 PG (ref 26–34)
MCHC RBC AUTO-ENTMCNC: 32.8 G/DL (ref 32–36)
MCV RBC AUTO: 97 FL (ref 80–100)
MONOCYTES # BLD AUTO: 0.72 X10*3/UL (ref 0.1–1)
MONOCYTES NFR BLD AUTO: 9.4 %
NEUTROPHILS # BLD AUTO: 4.43 X10*3/UL (ref 1.2–7.7)
NEUTROPHILS NFR BLD AUTO: 58 %
NON HDL CHOLESTEROL: 123 MG/DL (ref 0–149)
NRBC BLD-RTO: 0 /100 WBCS (ref 0–0)
PLATELET # BLD AUTO: 361 X10*3/UL (ref 150–450)
POTASSIUM SERPL-SCNC: 4.4 MMOL/L (ref 3.5–5.3)
PROT SERPL-MCNC: 6.6 G/DL (ref 6.4–8.2)
RBC # BLD AUTO: 4.09 X10*6/UL (ref 4–5.2)
SODIUM SERPL-SCNC: 139 MMOL/L (ref 136–145)
TIBC SERPL-MCNC: 415 UG/DL (ref 240–445)
TRIGL SERPL-MCNC: 78 MG/DL (ref 0–149)
TSH SERPL-ACNC: 1.01 MIU/L (ref 0.44–3.98)
UIBC SERPL-MCNC: 357 UG/DL (ref 110–370)
VIT B12 SERPL-MCNC: 752 PG/ML (ref 211–911)
VLDL: 16 MG/DL (ref 0–40)
WBC # BLD AUTO: 7.6 X10*3/UL (ref 4.4–11.3)

## 2024-10-05 PROCEDURE — 83036 HEMOGLOBIN GLYCOSYLATED A1C: CPT

## 2024-10-05 PROCEDURE — 85025 COMPLETE CBC W/AUTO DIFF WBC: CPT

## 2024-10-05 PROCEDURE — 80053 COMPREHEN METABOLIC PANEL: CPT

## 2024-10-05 PROCEDURE — 82306 VITAMIN D 25 HYDROXY: CPT

## 2024-10-05 PROCEDURE — 82607 VITAMIN B-12: CPT

## 2024-10-05 PROCEDURE — 82533 TOTAL CORTISOL: CPT

## 2024-10-05 PROCEDURE — 80061 LIPID PANEL: CPT

## 2024-10-05 PROCEDURE — 83550 IRON BINDING TEST: CPT

## 2024-10-05 PROCEDURE — 83540 ASSAY OF IRON: CPT

## 2024-10-05 PROCEDURE — 36415 COLL VENOUS BLD VENIPUNCTURE: CPT

## 2024-10-05 PROCEDURE — 84443 ASSAY THYROID STIM HORMONE: CPT

## 2024-10-06 LAB
EST. AVERAGE GLUCOSE BLD GHB EST-MCNC: 114 MG/DL
HBA1C MFR BLD: 5.6 %

## 2024-10-07 NOTE — RESULT ENCOUNTER NOTE
Abdulkadir Ribeiro-  The iron is good   The cholesterol is much better   The glucose control is good   The Vitamin B12 is good   Vitamin D is good range   Cortisol is normal   Thyroid is normal   Rest of the labs are good range

## 2024-10-15 ENCOUNTER — APPOINTMENT (OUTPATIENT)
Dept: INFUSION THERAPY | Facility: CLINIC | Age: 59
End: 2024-10-15
Payer: COMMERCIAL

## 2024-10-28 DIAGNOSIS — M79.7 FIBROMYALGIA: Primary | ICD-10-CM

## 2024-10-28 DIAGNOSIS — G62.89 OTHER POLYNEUROPATHY: ICD-10-CM

## 2024-10-28 RX ORDER — KETAMINE HCL IN NACL, ISO-OSM 100MG/10ML
SYRINGE (ML) INJECTION ONCE
Status: CANCELLED | OUTPATIENT
Start: 2024-10-30

## 2024-10-28 RX ORDER — KETOROLAC TROMETHAMINE 30 MG/ML
30 INJECTION, SOLUTION INTRAMUSCULAR; INTRAVENOUS ONCE
Status: CANCELLED | OUTPATIENT
Start: 2024-10-30 | End: 2024-10-30

## 2024-10-30 ENCOUNTER — INFUSION (OUTPATIENT)
Dept: INFUSION THERAPY | Facility: CLINIC | Age: 59
End: 2024-10-30
Payer: COMMERCIAL

## 2024-10-30 ENCOUNTER — APPOINTMENT (OUTPATIENT)
Dept: INFUSION THERAPY | Facility: CLINIC | Age: 59
End: 2024-10-30
Payer: COMMERCIAL

## 2024-10-30 VITALS
DIASTOLIC BLOOD PRESSURE: 75 MMHG | RESPIRATION RATE: 17 BRPM | SYSTOLIC BLOOD PRESSURE: 126 MMHG | TEMPERATURE: 96.8 F | HEART RATE: 73 BPM | OXYGEN SATURATION: 96 %

## 2024-10-30 DIAGNOSIS — G62.89 OTHER POLYNEUROPATHY: ICD-10-CM

## 2024-10-30 DIAGNOSIS — M79.7 FIBROMYALGIA: ICD-10-CM

## 2024-10-30 PROCEDURE — 96375 TX/PRO/DX INJ NEW DRUG ADDON: CPT | Mod: INF

## 2024-10-30 PROCEDURE — 96365 THER/PROPH/DIAG IV INF INIT: CPT | Mod: INF

## 2024-10-30 PROCEDURE — 96368 THER/DIAG CONCURRENT INF: CPT | Mod: INF

## 2024-10-30 PROCEDURE — 2500000004 HC RX 250 GENERAL PHARMACY W/ HCPCS (ALT 636 FOR OP/ED): Performed by: ANESTHESIOLOGY

## 2024-10-30 PROCEDURE — 2500000004 HC RX 250 GENERAL PHARMACY W/ HCPCS (ALT 636 FOR OP/ED): Performed by: NURSE PRACTITIONER

## 2024-10-30 RX ORDER — ONDANSETRON HYDROCHLORIDE 2 MG/ML
4 INJECTION, SOLUTION INTRAVENOUS ONCE
Status: COMPLETED | OUTPATIENT
Start: 2024-10-30 | End: 2024-10-30

## 2024-10-30 RX ORDER — METOPROLOL TARTRATE 25 MG/1
25 TABLET, FILM COATED ORAL ONCE
OUTPATIENT
Start: 2024-11-13 | End: 2024-11-13

## 2024-10-30 RX ORDER — ONDANSETRON HYDROCHLORIDE 2 MG/ML
4 INJECTION, SOLUTION INTRAVENOUS ONCE
OUTPATIENT
Start: 2024-11-13 | End: 2024-11-13

## 2024-10-30 RX ORDER — KETAMINE HCL IN NACL, ISO-OSM 100MG/10ML
SYRINGE (ML) INJECTION ONCE
Status: COMPLETED | OUTPATIENT
Start: 2024-10-30 | End: 2024-10-30

## 2024-10-30 RX ORDER — EPINEPHRINE 0.3 MG/.3ML
0.3 INJECTION SUBCUTANEOUS EVERY 5 MIN PRN
OUTPATIENT
Start: 2024-11-13

## 2024-10-30 RX ORDER — ALBUTEROL SULFATE 0.83 MG/ML
3 SOLUTION RESPIRATORY (INHALATION) AS NEEDED
OUTPATIENT
Start: 2024-11-13

## 2024-10-30 RX ORDER — FAMOTIDINE 10 MG/ML
20 INJECTION INTRAVENOUS ONCE AS NEEDED
OUTPATIENT
Start: 2024-11-13

## 2024-10-30 RX ORDER — NITROGLYCERIN 0.4 MG/1
0.4 TABLET SUBLINGUAL ONCE
OUTPATIENT
Start: 2024-11-13 | End: 2024-11-13

## 2024-10-30 RX ORDER — KETOROLAC TROMETHAMINE 30 MG/ML
30 INJECTION, SOLUTION INTRAMUSCULAR; INTRAVENOUS ONCE
OUTPATIENT
Start: 2024-11-13 | End: 2024-11-13

## 2024-10-30 RX ORDER — DIPHENHYDRAMINE HYDROCHLORIDE 50 MG/ML
50 INJECTION INTRAMUSCULAR; INTRAVENOUS AS NEEDED
OUTPATIENT
Start: 2024-11-13

## 2024-10-30 RX ORDER — KETAMINE HCL IN NACL, ISO-OSM 100MG/10ML
SYRINGE (ML) INJECTION ONCE
OUTPATIENT
Start: 2024-11-13

## 2024-10-30 RX ORDER — KETOROLAC TROMETHAMINE 30 MG/ML
30 INJECTION, SOLUTION INTRAMUSCULAR; INTRAVENOUS ONCE
Status: COMPLETED | OUTPATIENT
Start: 2024-10-30 | End: 2024-10-30

## 2024-10-30 ASSESSMENT — COLUMBIA-SUICIDE SEVERITY RATING SCALE - C-SSRS
6. HAVE YOU EVER DONE ANYTHING, STARTED TO DO ANYTHING, OR PREPARED TO DO ANYTHING TO END YOUR LIFE?: NO
2. HAVE YOU ACTUALLY HAD ANY THOUGHTS OF KILLING YOURSELF?: NO
1. IN THE PAST MONTH, HAVE YOU WISHED YOU WERE DEAD OR WISHED YOU COULD GO TO SLEEP AND NOT WAKE UP?: NO

## 2024-10-30 ASSESSMENT — ENCOUNTER SYMPTOMS
DEPRESSION: 0
LOSS OF SENSATION IN FEET: 0
OCCASIONAL FEELINGS OF UNSTEADINESS: 1

## 2024-10-30 ASSESSMENT — PAIN SCALES - GENERAL: PAINLEVEL_OUTOF10: 0 - NO PAIN

## 2024-11-08 ENCOUNTER — OFFICE VISIT (OUTPATIENT)
Dept: GASTROENTEROLOGY | Facility: CLINIC | Age: 59
End: 2024-11-08
Payer: COMMERCIAL

## 2024-11-08 VITALS — SYSTOLIC BLOOD PRESSURE: 118 MMHG | DIASTOLIC BLOOD PRESSURE: 68 MMHG

## 2024-11-08 DIAGNOSIS — R14.0 ABDOMINAL BLOATING: ICD-10-CM

## 2024-11-08 PROCEDURE — 91065 BREATH HYDROGEN/METHANE TEST: CPT | Performed by: INTERNAL MEDICINE

## 2024-11-08 PROCEDURE — 91065 BREATH HYDROGEN/METHANE TEST: CPT

## 2024-11-08 NOTE — PROGRESS NOTES
Patient ID: Gema High is a 59 y.o. female.    Breath Hydrogen Test-Bacterial Overgrowth    Date/Time: 11/8/2024 1:18 PM    Performed by: Missy Davis MA  Authorized by: Carlitos Metzger MD    Consent:     Consent obtained:  Verbal    Consent given by:  Patient  Hydrogen Breath Analysis Consultation Sheet    Referring Provider: Carlitos Metzger MD  [address] [id number]    Indication: Rule Out SIBO    Symptoms: Bloating    Age: 59 y.o.  Weight: There were no vitals filed for this visit.  Substrate: Dextrose   Dose: 75 mg    Last Meal: Tea, Water, Chicken , Rice, Chicken broth  Recent Antibiotics: Pt denies    RESULTS:   Time PPM (H2) APPM* (CH4) CO2 Correction   Baseline #1 9:10 am 8 9 4.2 1.30   Baseline #2 9:11 am 7 10 3.8 1.44   *Challenge Dose Sugar: 9:12 am   15' 9:30 am 11 11 3.6 1.52   30' 9:45 am 6 10 3.9 1.41   45' 10:00 am 4 8 4.0 1.37   60' 10:15 am 3 8 4.1 1.34   75' 10:30 am 4 10 4.0 1.37   90' 10:45 am 4 9 4.4 1.25   105' 11:00 am 3 8 4.1 1.37   120' 11:15 am 3 8 4.1 1.37   135' 11:30 am 1 9 3.7 1.48   150'        165'        180'          Impression: Negative for SIBO

## 2024-11-11 ENCOUNTER — OFFICE VISIT (OUTPATIENT)
Dept: PAIN MEDICINE | Facility: CLINIC | Age: 59
End: 2024-11-11
Payer: COMMERCIAL

## 2024-11-11 VITALS
TEMPERATURE: 98.2 F | HEIGHT: 63 IN | HEART RATE: 87 BPM | OXYGEN SATURATION: 93 % | BODY MASS INDEX: 31.89 KG/M2 | RESPIRATION RATE: 18 BRPM | WEIGHT: 180 LBS | SYSTOLIC BLOOD PRESSURE: 141 MMHG | DIASTOLIC BLOOD PRESSURE: 77 MMHG

## 2024-11-11 DIAGNOSIS — M79.7 FIBROMYALGIA: Primary | ICD-10-CM

## 2024-11-11 DIAGNOSIS — M70.62 TROCHANTERIC BURSITIS OF LEFT HIP: Primary | ICD-10-CM

## 2024-11-11 DIAGNOSIS — G62.89 OTHER POLYNEUROPATHY: ICD-10-CM

## 2024-11-11 DIAGNOSIS — M79.7 FIBROMYALGIA: ICD-10-CM

## 2024-11-11 PROCEDURE — 3008F BODY MASS INDEX DOCD: CPT | Performed by: NURSE PRACTITIONER

## 2024-11-11 PROCEDURE — G2212 PROLONG OUTPT/OFFICE VIS: HCPCS | Performed by: NURSE PRACTITIONER

## 2024-11-11 PROCEDURE — 99417 PROLNG OP E/M EACH 15 MIN: CPT | Performed by: NURSE PRACTITIONER

## 2024-11-11 PROCEDURE — 1036F TOBACCO NON-USER: CPT | Performed by: NURSE PRACTITIONER

## 2024-11-11 PROCEDURE — 99215 OFFICE O/P EST HI 40 MIN: CPT | Performed by: NURSE PRACTITIONER

## 2024-11-11 ASSESSMENT — ENCOUNTER SYMPTOMS
SHORTNESS OF BREATH: 0
NECK PAIN: 1
CHILLS: 0
CHEST TIGHTNESS: 0
DEPRESSION: 1
BACK PAIN: 1
CONSTIPATION: 0
DIARRHEA: 0
LOSS OF SENSATION IN FEET: 0
NAUSEA: 0
OCCASIONAL FEELINGS OF UNSTEADINESS: 1
PALPITATIONS: 0
WHEEZING: 0
FATIGUE: 0
MYALGIAS: 1

## 2024-11-11 ASSESSMENT — PAIN SCALES - GENERAL
PAINLEVEL_OUTOF10: 9

## 2024-11-11 ASSESSMENT — PAIN - FUNCTIONAL ASSESSMENT: PAIN_FUNCTIONAL_ASSESSMENT: 0-10

## 2024-11-11 NOTE — H&P (VIEW-ONLY)
Chief Complain  Follow-up for widespread muscle and joint pain radiating from her neck to her lumbar spine and bilateral knee pain    History Of Present Illness  Gema High is a 59 y.o. female here for cervical pain, lower back pain rating to bilateral hips and bilateral knee pain. The patient rates the pain at 9  on a scale from 0-10.  The patient describes pain as sharp, dull, aching, radiating, throbbing, shooting, pounding.  The pain is worsened by bending forward, standing, prolonged sitting, walking, lifting, twisting, going up stairs, and most movements aggravate pain  and is alleviated by heat, position change, and nothing relieves the pain.  Since the last visit the pain has worsened.    The patient denies any fever, chills, weight loss, weakness, bladder/ bowel incontinence, history of cancer, history of IV drug abuse, recent trauma.     Prior office visit:  6/27/2024 Dr. Kilpatrick  This is 58 y.o.  female with PMH of obesity BMI 30, smoker, depression, fibromyalgia, IBS, bilateral feet pain with normal EMG and no foraminal or canal stenosis in the MRI of the lumbar spine.  A new MRI on 5/29/2024 for cervical spine showed cervical fusion C3-C7 with minimal C7-T1 foraminal stenosis.  The patient has significant burning in her left foot treated with Zoloft and gabapentin without much benefit failed Cymbalta.  Patient received on 5/7/2024 L3 LSP who is here for follow-up who is back here today and she forgot that she had her injection until I reminded her that she stated that her sleep feels good but she has low back pain she has hips pain.  To me the patient has really a lot of fibromyalgia symptoms and the only thing that is going to help her is IV infusion therapy.  Patient stated that she had no family is can bring her over for the infusion.  I do not see any injection to help evaluate her heart at this time.  The supplement helped her in the beginning and now is not effective       Procedures:  5/7/2024  left lumbar L3-4 lumbar sympathetic nerve block       Portions of record reviewed for pertinent issues: active problem list, medication list, allergies, family history, social history, notes from last encounter, encounters, lab results, imaging and other available records.      I have personally reviewed the [facility] report for this patient. This report is scanned into the electronic medical record. I have considered the risks of abuse, dependence, addiction and diversion. It showed: Gabapentin, Adderall Dr. Pagan  Aberrant behavior: None    Past Medical History  She has a past medical history of Acute pharyngitis, unspecified (10/21/2014), Anxiety disorder, unspecified, Chronic rhinitis (09/08/2015), CTS (carpal tunnel syndrome) (2022), Fibrocystic breast (1989), Fibroid (Recurring), Furuncle of buttock (09/06/2017), Major depressive disorder, recurrent, unspecified, Migraine (2008), Other malaise (07/09/2020), Personal history of diseases of the skin and subcutaneous tissue, Personal history of other diseases of the digestive system (11/24/2014), Personal history of other diseases of the musculoskeletal system and connective tissue, Personal history of other diseases of the respiratory system (12/06/2021), Pneumonia, unspecified organism (10/06/2022), and Rheumatoid arthritis (1997).    Surgical History  She has a past surgical history that includes Tubal ligation (01/04/2013); Breast surgery (01/04/2013); Other surgical history (01/04/2013); Other surgical history (01/04/2013); Other surgical history (01/04/2013); Esophagogastroduodenoscopy (01/04/2013); Colonoscopy w/ polypectomy (01/04/2013); Hysteroscopy (02/19/2015); and Reduction mammaplasty.     Social History  She reports that she quit smoking about 22 months ago. Her smoking use included cigarettes. She started smoking about 42 years ago. She has a 43 pack-year smoking history. She has been exposed to tobacco smoke. She has never used smokeless tobacco. She  reports that she does not currently use alcohol. She reports that she does not use drugs.    Family History  Family History   Problem Relation Name Age of Onset    Diabetes Mother Jona Adamson     Epilepsy Mother Jona Adamson         and recurrent seizures    Hypertension Mother Jona Adamson         essential htn    Other (Bronchitis) Mother Jona Adamson     Pancreatic cancer Mother Jona Adamson     Arthritis Mother Jona Adamson     Diabetes Father Anil Turner     Other (cardiac disorder) Father Anil Conder     Other (stroke syndrome) Father Anil Conder     Other (bronchitis) Daughter      Breast cancer Father's Sister  50 - 59        Allergies  Adalimumab, Duloxetine, Escitalopram oxalate, Esomeprazole, Fluoxetine, Omeprazole, Pantoprazole, Topiramate, Venlafaxine, and Wellbutrin [bupropion hcl]    Review of Systems  Review of Systems   Constitutional:  Negative for chills and fatigue.   Respiratory:  Negative for chest tightness, shortness of breath and wheezing.    Cardiovascular:  Negative for chest pain and palpitations.   Gastrointestinal:  Negative for constipation, diarrhea and nausea.   Musculoskeletal:  Positive for back pain, myalgias and neck pain.        Cervical lumbar pain radiating to left hip and bilateral knee pain        Physical Exam  Physical Exam  Constitutional:       General: She is not in acute distress.     Appearance: She is obese.   Eyes:      Extraocular Movements: Extraocular movements intact.   Musculoskeletal:      Lumbar back: Tenderness present. Decreased range of motion.      Comments: Lumbar decreased range of motion flexion extension, left lateral bending pain.  Bilateral sacroiliac notch nontender, bilateral lumbar paraspinals tender to palpation left GT bursa tender palpation, left hip no pain with internal/external rotation, adduction or abduction.  Lateral lower extremities reflexes intact.   Neurological:      General: No focal deficit present.      Mental  "Status: She is alert and oriented to person, place, and time.      GCS: GCS eye subscore is 4. GCS verbal subscore is 5. GCS motor subscore is 6.      Cranial Nerves: Cranial nerves 2-12 are intact.      Sensory: Sensation is intact.      Motor: Motor function is intact.      Coordination: Coordination is intact.      Gait: Gait is intact.      Deep Tendon Reflexes:      Reflex Scores:       Patellar reflexes are 3+ on the right side and 3+ on the left side.       Achilles reflexes are 3+ on the right side and 3+ on the left side.  Psychiatric:         Attention and Perception: Attention normal.         Mood and Affect: Mood normal.         Behavior: Behavior normal.         Thought Content: Thought content normal.         Judgment: Judgment normal.           Last Recorded Vitals  Blood pressure 141/77, pulse 87, temperature 36.8 °C (98.2 °F), resp. rate 18, height 1.6 m (5' 3\"), weight 81.6 kg (180 lb), SpO2 93%.    Reviewed Images  5/29/2024 MRI cervical spine showed C3-C7 anterior fusion no significant canal stenosis mild neuroforaminal stenosis at C7-T1:  FINDINGS:  There are postsurgical changes of anterior fusion and discectomy at  C3 through C7 with associated metallic susceptibility artifact.      Alignment: The vertebral alignment is within normal limits.      Vertebrae/Intervertebral Discs: The vertebral bodies demonstrate  expected height.  The marrow signal is within normal limits. There is  desiccated disc signal at C2-C3 and within the upper thoracic spine.      Cord: Normal in caliber and signal.      C1-C2: The cervicomedullary junction appears unremarkable. There is  no spinal canal stenosis.      C2-C3: No spinal canal or neural foraminal stenosis.      C3-C4: Mild disc osteophyte complex and uncovertebral joint  hypertrophy. No spinal canal stenosis. No neural foraminal stenosis.      C4-C5: No spinal canal or neural foraminal stenosis.      C5-C6: No spinal canal or neural foraminal stenosis.    "   C6-C7: No spinal canal or neural foraminal stenosis.      C7-T1: Mild disc osteophyte complex and uncovertebral joint  hypertrophy and facet arthrosis. No spinal canal stenosis. Mild  neural foraminal stenosis.      The upper thoracic vertebrae and spinal canal are unremarkable.      The prevertebral and posterior paraspinous soft tissues are within  normal limits.      IMPRESSION:  Postsurgical changes of anterior fusion and discectomy at C3 through  C7.      No significant spinal canal stenosis. Mild neural foraminal stenosis  at C7-T1.      MACRO:  None      Signed by: Carmen Schaefer 5/29/2024 11:28 AM        3/5/2024 bilateral lower extremity EMG were normal     2/28/2024 MRI lumbar spine report minimal degenerative changes  FINDINGS:  There is a transitional lumbosacral vertebral body which is  lumbarized on the right and sacralized on the left and for the sake  of this dictation will be labeled L5.      The coronal  images demonstrate a mild dextrocurvature of the  lumbar spine.      There is minimal retrolisthesis of L1 on L2.      There are degenerative signal changes noted along endplates within  lumbar region.      The visualized spinal cord demonstrates no signal abnormality within  it.  The conus medullaris is normally positioned terminating at the  L1/2 level.      There is diminished disc signal at the L1/2 through L4/5 levels  compatible with degenerative disc disease.      At the L5/S1 level,  there is no significant spinal canal stenosis or  neuroforaminal stenosis.      At the L4/L5 level,  there is a posterior disc bulge along with  degenerative facet changes and ligamentum flavum hypertrophy  contributing to mild spinal canal narrowing. There is moderate right  and mild-to-moderate left-sided neural foraminal narrowing.      At the L3/L4 level,  there is a mild posterior disc bulge along with  degenerative facet changes and ligamentum flavum hypertrophy  contributing to mild spinal canal  narrowing. There is  mild-to-moderate encroachment upon the neural foramen bilaterally.      At the L2/L3 level,  there is a posterior disc bulge along with  degenerative facet changes and ligamentum flavum hypertrophy  contributing to mild spinal canal narrowing. There is moderate  encroachment upon the neural foramen bilaterally.      At the L1/L2 level,  there is minimal retrolisthesis of L1 on L2, a  posterior disc bulge along with degenerative facet changes and  ligamentum flavum hypertrophy contributing to mild spinal canal  narrowing. There is moderate right and mild-to-moderate left-sided  neural foraminal narrowing.      At the T12/L1 level,  there is a small central disc herniation  measuring 1 mm in AP dimension without significant spinal canal or  neural foraminal narrowing.      At the T11/12 level, there are degenerative facet changes without  significant spinal canal narrowing. There is moderate encroachment  upon the neural foramen bilaterally.      At the T10/11 level, there is a posterior disc/osteophyte complex  contributing to mild spinal canal narrowing. There are degenerative  facet changes. There is mild-to-moderate encroachment upon the neural foramen bilaterally.          IMPRESSION:  There is a transitional lumbosacral vertebral body which is  lumbarized on the right and sacralized on the left and for the sake  of this dictation will be labeled L5.      The coronal  images demonstrate a mild dextrocurvature of the  lumbar spine.      There is minimal retrolisthesis of L1 on L2.      There is multilevel spondylosis with varying degrees of spinal canal  and neural foraminal narrowing as described above.  Data reviewed:  FINDINGS:  There is a transitional lumbosacral vertebral body which is  lumbarized on the right and sacralized on the left and for the sake  of this dictation will be labeled L5.      The coronal  images demonstrate a mild dextrocurvature of the  lumbar spine.       There is minimal retrolisthesis of L1 on L2.      There are degenerative signal changes noted along endplates within  lumbar region.      The visualized spinal cord demonstrates no signal abnormality within  it.  The conus medullaris is normally positioned terminating at the  L1/2 level.      There is diminished disc signal at the L1/2 through L4/5 levels  compatible with degenerative disc disease.      At the L5/S1 level,  there is no significant spinal canal stenosis or  neuroforaminal stenosis.      At the L4/L5 level,  there is a posterior disc bulge along with  degenerative facet changes and ligamentum flavum hypertrophy  contributing to mild spinal canal narrowing. There is moderate right  and mild-to-moderate left-sided neural foraminal narrowing.      At the L3/L4 level,  there is a mild posterior disc bulge along with  degenerative facet changes and ligamentum flavum hypertrophy  contributing to mild spinal canal narrowing. There is  mild-to-moderate encroachment upon the neural foramen bilaterally.      At the L2/L3 level,  there is a posterior disc bulge along with  degenerative facet changes and ligamentum flavum hypertrophy  contributing to mild spinal canal narrowing. There is moderate  encroachment upon the neural foramen bilaterally.      At the L1/L2 level,  there is minimal retrolisthesis of L1 on L2, a  posterior disc bulge along with degenerative facet changes and  ligamentum flavum hypertrophy contributing to mild spinal canal  narrowing. There is moderate right and mild-to-moderate left-sided  neural foraminal narrowing.      At the T12/L1 level,  there is a small central disc herniation  measuring 1 mm in AP dimension without significant spinal canal or  neural foraminal narrowing.      At the T11/12 level, there are degenerative facet changes without  significant spinal canal narrowing. There is moderate encroachment  upon the neural foramen bilaterally.      At the T10/11 level, there is a  posterior disc/osteophyte complex  contributing to mild spinal canal narrowing. There are degenerative  facet changes. There is mild-to-moderate encroachment upon the neural  foramen bilaterally.          IMPRESSION:  There is a transitional lumbosacral vertebral body which is  lumbarized on the right and sacralized on the left and for the sake  of this dictation will be labeled L5.      The coronal  images demonstrate a mild dextrocurvature of the  lumbar spine.      There is minimal retrolisthesis of L1 on L2.      There is multilevel spondylosis with varying degrees of spinal canal  and neural foraminal narrowing as described above.           Reviewed Labs  Lab Results   Component Value Date    GLUCOSE 96 10/05/2024    CALCIUM 9.2 10/05/2024     10/05/2024    K 4.4 10/05/2024    CO2 26 10/05/2024     10/05/2024    BUN 15 10/05/2024    CREATININE 0.60 10/05/2024         Assessment/Plan     Gema High is a 59 y.o. female recalled past medical history of obesity BMI 30, smoker, depression, fibromyalgia, irritable syndrome, gastroparesis, bilateral lower extremity EMG normal, lumbar MRI no foraminal or canal stenosis noted.  Who is here for follow-up for IV infusion therapy for widespread fibromyalgia pain.  She complains of cervical lumbar bilateral hip and bilateral knee pain.  She was trialed on IV infusion therapy which provides 30 to 40% relief for roughly 3 to 4 days.  She has not had her infusion therapy consistently every 2 weeks it has been spaced out over a month, the plan is continue to get every 2 weeks therapy to improve symptom relief.  Of note patient received bilateral knee corticosteroid injections with minimal relief in symptoms.  She endorses left hip pain and has difficulty laying on that side.  She works long hours sitting in front of a computer which does not improve her symptoms.  On physical examination she has pain on palpation to left greater trochanteric bursa, consistent  with bursitis.  Left hip full range of motion.  Bilateral lumbar paraspinals tender to palpation, bilateral lower extremities vibratory sensation reflex intact.  She denies any lumbar radiculopathy symptoms.  She denies any bowel or bladder and continence or saddle paresthesia. I spent time with the patient discussing all of the risks, benefits, and alternatives to this measure. Including but not limited to hematoma/abscess, and steroid effects. The patient understands and will schedule a repeat as needed.   Plan to continue with IV infusion therapy every 2 weeks to improve widespread fibromyalgia pain.  Discussed repeating bilateral knee injections in the future if symptoms not improving.  Patient verbalized understand plan of care.    Plan  At least 50% of the visit was involved in the discussion of the options for treatment. We discussed exercises, medication, interventional therapies and surgery. Healthy life style is essential with patient hard work to achieve the wellness. In addition; discussion with the patient and/or family about any of the diagnostic results, impressions and/or recommended diagnostic studies, prognosis, risks and benefits of treatment options, instructions for treatment and/or follow-up, importance of compliance with chosen treatment options, risk-factor reduction, and patient/family education.        *Please note this report has been produced using speech recognition software and may contain errors related to that system including grammar, punctuation and spelling as well as words and phrases that may be inappropriate. If there are questions or concerns, please feel free to contact me to clarify.      I spent 57 minutes in the professional and overall care of this patient.       Marv Mae, APRN-CNP

## 2024-11-11 NOTE — PROGRESS NOTES
Chief Complain  Follow-up for widespread muscle and joint pain radiating from her neck to her lumbar spine and bilateral knee pain    History Of Present Illness  Gema High is a 59 y.o. female here for cervical pain, lower back pain rating to bilateral hips and bilateral knee pain. The patient rates the pain at 9  on a scale from 0-10.  The patient describes pain as sharp, dull, aching, radiating, throbbing, shooting, pounding.  The pain is worsened by bending forward, standing, prolonged sitting, walking, lifting, twisting, going up stairs, and most movements aggravate pain  and is alleviated by heat, position change, and nothing relieves the pain.  Since the last visit the pain has worsened.    The patient denies any fever, chills, weight loss, weakness, bladder/ bowel incontinence, history of cancer, history of IV drug abuse, recent trauma.     Prior office visit:  6/27/2024 Dr. Kilpatrick  This is 58 y.o.  female with PMH of obesity BMI 30, smoker, depression, fibromyalgia, IBS, bilateral feet pain with normal EMG and no foraminal or canal stenosis in the MRI of the lumbar spine.  A new MRI on 5/29/2024 for cervical spine showed cervical fusion C3-C7 with minimal C7-T1 foraminal stenosis.  The patient has significant burning in her left foot treated with Zoloft and gabapentin without much benefit failed Cymbalta.  Patient received on 5/7/2024 L3 LSP who is here for follow-up who is back here today and she forgot that she had her injection until I reminded her that she stated that her sleep feels good but she has low back pain she has hips pain.  To me the patient has really a lot of fibromyalgia symptoms and the only thing that is going to help her is IV infusion therapy.  Patient stated that she had no family is can bring her over for the infusion.  I do not see any injection to help evaluate her heart at this time.  The supplement helped her in the beginning and now is not effective       Procedures:  5/7/2024  left lumbar L3-4 lumbar sympathetic nerve block       Portions of record reviewed for pertinent issues: active problem list, medication list, allergies, family history, social history, notes from last encounter, encounters, lab results, imaging and other available records.      I have personally reviewed the O[facility] report for this patient. This report is scanned into the electronic medical record. I have considered the risks of abuse, dependence, addiction and diversion. It showed: Gabapentin, Adderall Dr. Pagan  Aberrant behavior: None    Past Medical History  She has a past medical history of Acute pharyngitis, unspecified (10/21/2014), Anxiety disorder, unspecified, Chronic rhinitis (09/08/2015), CTS (carpal tunnel syndrome) (2022), Fibrocystic breast (1989), Fibroid (Recurring), Furuncle of buttock (09/06/2017), Major depressive disorder, recurrent, unspecified, Migraine (2008), Other malaise (07/09/2020), Personal history of diseases of the skin and subcutaneous tissue, Personal history of other diseases of the digestive system (11/24/2014), Personal history of other diseases of the musculoskeletal system and connective tissue, Personal history of other diseases of the respiratory system (12/06/2021), Pneumonia, unspecified organism (10/06/2022), and Rheumatoid arthritis (1997).    Surgical History  She has a past surgical history that includes Tubal ligation (01/04/2013); Breast surgery (01/04/2013); Other surgical history (01/04/2013); Other surgical history (01/04/2013); Other surgical history (01/04/2013); Esophagogastroduodenoscopy (01/04/2013); Colonoscopy w/ polypectomy (01/04/2013); Hysteroscopy (02/19/2015); and Reduction mammaplasty.     Social History  She reports that she quit smoking about 22 months ago. Her smoking use included cigarettes. She started smoking about 42 years ago. She has a 43 pack-year smoking history. She has been exposed to tobacco smoke. She has never used smokeless tobacco. She  reports that she does not currently use alcohol. She reports that she does not use drugs.    Family History  Family History   Problem Relation Name Age of Onset    Diabetes Mother Jona Adamson     Epilepsy Mother Jona Adamson         and recurrent seizures    Hypertension Mother Jona Adamson         essential htn    Other (Bronchitis) Mother Jona Adamson     Pancreatic cancer Mother Jona Adamson     Arthritis Mother Jona Adamson     Diabetes Father Anil Turner     Other (cardiac disorder) Father Anil Conder     Other (stroke syndrome) Father Anil Conder     Other (bronchitis) Daughter      Breast cancer Father's Sister  50 - 59        Allergies  Adalimumab, Duloxetine, Escitalopram oxalate, Esomeprazole, Fluoxetine, Omeprazole, Pantoprazole, Topiramate, Venlafaxine, and Wellbutrin [bupropion hcl]    Review of Systems  Review of Systems   Constitutional:  Negative for chills and fatigue.   Respiratory:  Negative for chest tightness, shortness of breath and wheezing.    Cardiovascular:  Negative for chest pain and palpitations.   Gastrointestinal:  Negative for constipation, diarrhea and nausea.   Musculoskeletal:  Positive for back pain, myalgias and neck pain.        Cervical lumbar pain radiating to left hip and bilateral knee pain        Physical Exam  Physical Exam  Constitutional:       General: She is not in acute distress.     Appearance: She is obese.   Eyes:      Extraocular Movements: Extraocular movements intact.   Musculoskeletal:      Lumbar back: Tenderness present. Decreased range of motion.      Comments: Lumbar decreased range of motion flexion extension, left lateral bending pain.  Bilateral sacroiliac notch nontender, bilateral lumbar paraspinals tender to palpation left GT bursa tender palpation, left hip no pain with internal/external rotation, adduction or abduction.  Lateral lower extremities reflexes intact.   Neurological:      General: No focal deficit present.      Mental  "Status: She is alert and oriented to person, place, and time.      GCS: GCS eye subscore is 4. GCS verbal subscore is 5. GCS motor subscore is 6.      Cranial Nerves: Cranial nerves 2-12 are intact.      Sensory: Sensation is intact.      Motor: Motor function is intact.      Coordination: Coordination is intact.      Gait: Gait is intact.      Deep Tendon Reflexes:      Reflex Scores:       Patellar reflexes are 3+ on the right side and 3+ on the left side.       Achilles reflexes are 3+ on the right side and 3+ on the left side.  Psychiatric:         Attention and Perception: Attention normal.         Mood and Affect: Mood normal.         Behavior: Behavior normal.         Thought Content: Thought content normal.         Judgment: Judgment normal.           Last Recorded Vitals  Blood pressure 141/77, pulse 87, temperature 36.8 °C (98.2 °F), resp. rate 18, height 1.6 m (5' 3\"), weight 81.6 kg (180 lb), SpO2 93%.    Reviewed Images  5/29/2024 MRI cervical spine showed C3-C7 anterior fusion no significant canal stenosis mild neuroforaminal stenosis at C7-T1:  FINDINGS:  There are postsurgical changes of anterior fusion and discectomy at  C3 through C7 with associated metallic susceptibility artifact.      Alignment: The vertebral alignment is within normal limits.      Vertebrae/Intervertebral Discs: The vertebral bodies demonstrate  expected height.  The marrow signal is within normal limits. There is  desiccated disc signal at C2-C3 and within the upper thoracic spine.      Cord: Normal in caliber and signal.      C1-C2: The cervicomedullary junction appears unremarkable. There is  no spinal canal stenosis.      C2-C3: No spinal canal or neural foraminal stenosis.      C3-C4: Mild disc osteophyte complex and uncovertebral joint  hypertrophy. No spinal canal stenosis. No neural foraminal stenosis.      C4-C5: No spinal canal or neural foraminal stenosis.      C5-C6: No spinal canal or neural foraminal stenosis.    "   C6-C7: No spinal canal or neural foraminal stenosis.      C7-T1: Mild disc osteophyte complex and uncovertebral joint  hypertrophy and facet arthrosis. No spinal canal stenosis. Mild  neural foraminal stenosis.      The upper thoracic vertebrae and spinal canal are unremarkable.      The prevertebral and posterior paraspinous soft tissues are within  normal limits.      IMPRESSION:  Postsurgical changes of anterior fusion and discectomy at C3 through  C7.      No significant spinal canal stenosis. Mild neural foraminal stenosis  at C7-T1.      MACRO:  None      Signed by: Carmen Schaefer 5/29/2024 11:28 AM        3/5/2024 bilateral lower extremity EMG were normal     2/28/2024 MRI lumbar spine report minimal degenerative changes  FINDINGS:  There is a transitional lumbosacral vertebral body which is  lumbarized on the right and sacralized on the left and for the sake  of this dictation will be labeled L5.      The coronal  images demonstrate a mild dextrocurvature of the  lumbar spine.      There is minimal retrolisthesis of L1 on L2.      There are degenerative signal changes noted along endplates within  lumbar region.      The visualized spinal cord demonstrates no signal abnormality within  it.  The conus medullaris is normally positioned terminating at the  L1/2 level.      There is diminished disc signal at the L1/2 through L4/5 levels  compatible with degenerative disc disease.      At the L5/S1 level,  there is no significant spinal canal stenosis or  neuroforaminal stenosis.      At the L4/L5 level,  there is a posterior disc bulge along with  degenerative facet changes and ligamentum flavum hypertrophy  contributing to mild spinal canal narrowing. There is moderate right  and mild-to-moderate left-sided neural foraminal narrowing.      At the L3/L4 level,  there is a mild posterior disc bulge along with  degenerative facet changes and ligamentum flavum hypertrophy  contributing to mild spinal canal  narrowing. There is  mild-to-moderate encroachment upon the neural foramen bilaterally.      At the L2/L3 level,  there is a posterior disc bulge along with  degenerative facet changes and ligamentum flavum hypertrophy  contributing to mild spinal canal narrowing. There is moderate  encroachment upon the neural foramen bilaterally.      At the L1/L2 level,  there is minimal retrolisthesis of L1 on L2, a  posterior disc bulge along with degenerative facet changes and  ligamentum flavum hypertrophy contributing to mild spinal canal  narrowing. There is moderate right and mild-to-moderate left-sided  neural foraminal narrowing.      At the T12/L1 level,  there is a small central disc herniation  measuring 1 mm in AP dimension without significant spinal canal or  neural foraminal narrowing.      At the T11/12 level, there are degenerative facet changes without  significant spinal canal narrowing. There is moderate encroachment  upon the neural foramen bilaterally.      At the T10/11 level, there is a posterior disc/osteophyte complex  contributing to mild spinal canal narrowing. There are degenerative  facet changes. There is mild-to-moderate encroachment upon the neural foramen bilaterally.          IMPRESSION:  There is a transitional lumbosacral vertebral body which is  lumbarized on the right and sacralized on the left and for the sake  of this dictation will be labeled L5.      The coronal  images demonstrate a mild dextrocurvature of the  lumbar spine.      There is minimal retrolisthesis of L1 on L2.      There is multilevel spondylosis with varying degrees of spinal canal  and neural foraminal narrowing as described above.  Data reviewed:  FINDINGS:  There is a transitional lumbosacral vertebral body which is  lumbarized on the right and sacralized on the left and for the sake  of this dictation will be labeled L5.      The coronal  images demonstrate a mild dextrocurvature of the  lumbar spine.       There is minimal retrolisthesis of L1 on L2.      There are degenerative signal changes noted along endplates within  lumbar region.      The visualized spinal cord demonstrates no signal abnormality within  it.  The conus medullaris is normally positioned terminating at the  L1/2 level.      There is diminished disc signal at the L1/2 through L4/5 levels  compatible with degenerative disc disease.      At the L5/S1 level,  there is no significant spinal canal stenosis or  neuroforaminal stenosis.      At the L4/L5 level,  there is a posterior disc bulge along with  degenerative facet changes and ligamentum flavum hypertrophy  contributing to mild spinal canal narrowing. There is moderate right  and mild-to-moderate left-sided neural foraminal narrowing.      At the L3/L4 level,  there is a mild posterior disc bulge along with  degenerative facet changes and ligamentum flavum hypertrophy  contributing to mild spinal canal narrowing. There is  mild-to-moderate encroachment upon the neural foramen bilaterally.      At the L2/L3 level,  there is a posterior disc bulge along with  degenerative facet changes and ligamentum flavum hypertrophy  contributing to mild spinal canal narrowing. There is moderate  encroachment upon the neural foramen bilaterally.      At the L1/L2 level,  there is minimal retrolisthesis of L1 on L2, a  posterior disc bulge along with degenerative facet changes and  ligamentum flavum hypertrophy contributing to mild spinal canal  narrowing. There is moderate right and mild-to-moderate left-sided  neural foraminal narrowing.      At the T12/L1 level,  there is a small central disc herniation  measuring 1 mm in AP dimension without significant spinal canal or  neural foraminal narrowing.      At the T11/12 level, there are degenerative facet changes without  significant spinal canal narrowing. There is moderate encroachment  upon the neural foramen bilaterally.      At the T10/11 level, there is a  posterior disc/osteophyte complex  contributing to mild spinal canal narrowing. There are degenerative  facet changes. There is mild-to-moderate encroachment upon the neural  foramen bilaterally.          IMPRESSION:  There is a transitional lumbosacral vertebral body which is  lumbarized on the right and sacralized on the left and for the sake  of this dictation will be labeled L5.      The coronal  images demonstrate a mild dextrocurvature of the  lumbar spine.      There is minimal retrolisthesis of L1 on L2.      There is multilevel spondylosis with varying degrees of spinal canal  and neural foraminal narrowing as described above.           Reviewed Labs  Lab Results   Component Value Date    GLUCOSE 96 10/05/2024    CALCIUM 9.2 10/05/2024     10/05/2024    K 4.4 10/05/2024    CO2 26 10/05/2024     10/05/2024    BUN 15 10/05/2024    CREATININE 0.60 10/05/2024         Assessment/Plan     Gema Hgih is a 59 y.o. female recalled past medical history of obesity BMI 30, smoker, depression, fibromyalgia, irritable syndrome, gastroparesis, bilateral lower extremity EMG normal, lumbar MRI no foraminal or canal stenosis noted.  Who is here for follow-up for IV infusion therapy for widespread fibromyalgia pain.  She complains of cervical lumbar bilateral hip and bilateral knee pain.  She was trialed on IV infusion therapy which provides 30 to 40% relief for roughly 3 to 4 days.  She has not had her infusion therapy consistently every 2 weeks it has been spaced out over a month, the plan is continue to get every 2 weeks therapy to improve symptom relief.  Of note patient received bilateral knee corticosteroid injections with minimal relief in symptoms.  She endorses left hip pain and has difficulty laying on that side.  She works long hours sitting in front of a computer which does not improve her symptoms.  On physical examination she has pain on palpation to left greater trochanteric bursa, consistent  with bursitis.  Left hip full range of motion.  Bilateral lumbar paraspinals tender to palpation, bilateral lower extremities vibratory sensation reflex intact.  She denies any lumbar radiculopathy symptoms.  She denies any bowel or bladder and continence or saddle paresthesia. I spent time with the patient discussing all of the risks, benefits, and alternatives to this measure. Including but not limited to hematoma/abscess, and steroid effects. The patient understands and will schedule a repeat as needed.   Plan to continue with IV infusion therapy every 2 weeks to improve widespread fibromyalgia pain.  Discussed repeating bilateral knee injections in the future if symptoms not improving.  Patient verbalized understand plan of care.    Plan  At least 50% of the visit was involved in the discussion of the options for treatment. We discussed exercises, medication, interventional therapies and surgery. Healthy life style is essential with patient hard work to achieve the wellness. In addition; discussion with the patient and/or family about any of the diagnostic results, impressions and/or recommended diagnostic studies, prognosis, risks and benefits of treatment options, instructions for treatment and/or follow-up, importance of compliance with chosen treatment options, risk-factor reduction, and patient/family education.        *Please note this report has been produced using speech recognition software and may contain errors related to that system including grammar, punctuation and spelling as well as words and phrases that may be inappropriate. If there are questions or concerns, please feel free to contact me to clarify.      I spent 57 minutes in the professional and overall care of this patient.       Marv Mae, APRN-CNP

## 2024-11-12 ENCOUNTER — TELEPHONE (OUTPATIENT)
Dept: PAIN MEDICINE | Facility: CLINIC | Age: 59
End: 2024-11-12
Payer: COMMERCIAL

## 2024-11-13 ENCOUNTER — INFUSION (OUTPATIENT)
Dept: INFUSION THERAPY | Facility: CLINIC | Age: 59
End: 2024-11-13
Payer: COMMERCIAL

## 2024-11-13 VITALS
HEART RATE: 72 BPM | TEMPERATURE: 98.4 F | RESPIRATION RATE: 12 BRPM | SYSTOLIC BLOOD PRESSURE: 125 MMHG | DIASTOLIC BLOOD PRESSURE: 78 MMHG | OXYGEN SATURATION: 100 %

## 2024-11-13 DIAGNOSIS — R10.30 LOWER ABDOMINAL PAIN: ICD-10-CM

## 2024-11-13 DIAGNOSIS — M79.7 FIBROMYALGIA: ICD-10-CM

## 2024-11-13 DIAGNOSIS — G62.89 OTHER POLYNEUROPATHY: ICD-10-CM

## 2024-11-13 DIAGNOSIS — R14.0 ABDOMINAL BLOATING: Primary | ICD-10-CM

## 2024-11-13 PROCEDURE — 96375 TX/PRO/DX INJ NEW DRUG ADDON: CPT | Mod: INF

## 2024-11-13 PROCEDURE — 96368 THER/DIAG CONCURRENT INF: CPT | Mod: INF

## 2024-11-13 PROCEDURE — 2500000004 HC RX 250 GENERAL PHARMACY W/ HCPCS (ALT 636 FOR OP/ED): Performed by: NURSE PRACTITIONER

## 2024-11-13 PROCEDURE — 96365 THER/PROPH/DIAG IV INF INIT: CPT | Mod: INF

## 2024-11-13 RX ORDER — DIPHENHYDRAMINE HYDROCHLORIDE 50 MG/ML
50 INJECTION INTRAMUSCULAR; INTRAVENOUS AS NEEDED
OUTPATIENT
Start: 2024-11-27

## 2024-11-13 RX ORDER — NITROGLYCERIN 0.4 MG/1
0.4 TABLET SUBLINGUAL ONCE
OUTPATIENT
Start: 2024-11-27 | End: 2024-11-27

## 2024-11-13 RX ORDER — ALBUTEROL SULFATE 0.83 MG/ML
3 SOLUTION RESPIRATORY (INHALATION) AS NEEDED
OUTPATIENT
Start: 2024-11-27

## 2024-11-13 RX ORDER — ONDANSETRON HYDROCHLORIDE 2 MG/ML
4 INJECTION, SOLUTION INTRAVENOUS ONCE
OUTPATIENT
Start: 2024-11-27 | End: 2024-11-27

## 2024-11-13 RX ORDER — FAMOTIDINE 10 MG/ML
20 INJECTION INTRAVENOUS ONCE AS NEEDED
OUTPATIENT
Start: 2024-11-27

## 2024-11-13 RX ORDER — EPINEPHRINE 0.3 MG/.3ML
0.3 INJECTION SUBCUTANEOUS EVERY 5 MIN PRN
OUTPATIENT
Start: 2024-11-27

## 2024-11-13 RX ORDER — KETOROLAC TROMETHAMINE 30 MG/ML
30 INJECTION, SOLUTION INTRAMUSCULAR; INTRAVENOUS ONCE
OUTPATIENT
Start: 2024-11-27 | End: 2024-11-27

## 2024-11-13 RX ORDER — METOPROLOL TARTRATE 25 MG/1
25 TABLET, FILM COATED ORAL ONCE
OUTPATIENT
Start: 2024-11-27 | End: 2024-11-27

## 2024-11-13 RX ORDER — KETOROLAC TROMETHAMINE 30 MG/ML
30 INJECTION, SOLUTION INTRAMUSCULAR; INTRAVENOUS ONCE
Status: COMPLETED | OUTPATIENT
Start: 2024-11-13 | End: 2024-11-13

## 2024-11-13 RX ORDER — KETAMINE HCL IN NACL, ISO-OSM 100MG/10ML
SYRINGE (ML) INJECTION ONCE
Status: COMPLETED | OUTPATIENT
Start: 2024-11-13 | End: 2024-11-13

## 2024-11-13 RX ORDER — KETAMINE HCL IN NACL, ISO-OSM 100MG/10ML
SYRINGE (ML) INJECTION ONCE
OUTPATIENT
Start: 2024-11-27

## 2024-11-13 ASSESSMENT — ENCOUNTER SYMPTOMS
LOSS OF SENSATION IN FEET: 0
OCCASIONAL FEELINGS OF UNSTEADINESS: 1
DEPRESSION: 1

## 2024-11-13 ASSESSMENT — PAIN SCALES - GENERAL
PAINLEVEL_OUTOF10: 5 - MODERATE PAIN
PAINLEVEL_OUTOF10: 6

## 2024-11-13 NOTE — PROGRESS NOTES
S: Patient here for 3rd opioid sparing pain infusion. Patient reports 10% reduction in pain after last infusion that lasted 5 days.    Purpose of pain infusion meds explained along with potential side effects.  Patient verbalized understanding.    B: Pain Issues. Left shoulder    Is Patient breast feeding: N/A    A: Patient currently has pain described on flow sheet documentation. Designated  is Konstantin. Patient last ate solid food 6 hours ago, and had liquid 1 hours ago.    R: Plan; Obtain IV access, do patient risk assessment, and start opioid sparing infusion as ordered. Monitoring for S/S of adverse reactions.    Post infusion teaching provided. Patient verbalized understanding. VSS, Patient states pain is 5/10. Will assist patient to waiting car via wheelchair.

## 2024-11-13 NOTE — PATIENT INSTRUCTIONS
Today :We administered ketamine 30 mg-lidocaine 300 mg, propofol, and ketorolac.     For:   1. Fibromyalgia    2. Other polyneuropathy       (Tell all doctors including dentists that you are taking this medication)     Go to the emergency room or call 911 if:  -You have signs of allergic reaction:   -Rash, hives, itching.   -Swollen, blistered, peeling skin.   -Swelling of face, lips, mouth, tongue or throat.   -Tightness of chest, trouble breathing, swallowing or talking     Call your doctor:  - If IV / injection site gets red, warm, swollen, itchy or leaks fluid or pus.     (Leave dressing on your IV site for at least 2 hours and keep area clean and dry  - If you get sick or have symptoms of infection or are not feeling well for any reason.    (Wash your hands often, stay away from people who are sick)  - If you have side effects from your medication that do not go away or are bothersome.     (Refer to the teaching your nurse gave you for side effects to call your doctor about)    - Common side effects may include:  stuffy nose, headache, feeling tired, muscle aches, upset stomach  - Before receiving any vaccines     - Call the [facility] at   If:  - You get sick, are on antibiotics, have had a recent vaccine, have surgery or dental work and your doctor wants your visit rescheduled.  - You need to cancel and reschedule your visit for any reason. Call at least 2 days before your visit if you need to cancel.   - Your insurance changes before your next visit.    (We will need to get approval from your new insurance. This can take up to two weeks.)     The [facility] is opened Monday thru Friday. We are closed on weekends and holidays.   Voice mail will take your call if the center is closed. If you leave a message please allow 24 hours for a call back during weekdays. If you leave a message on a weekend/holiday, we will call you back the next business day.    A pharmacist is available Monday -  Friday from 8:30AM to 3:30PM to help answer any questions you may have about your prescriptions(s). Please call pharmacy at:    [facility]: [phone]  [facility]: [phone]  [facility]: [phone]              [facility]      Pain Infusion Aftercare Instructions      1. It is normal to feel sedated, tired and low in energy after a pain infusion. DO NOT DRIVE, OPERATE ANY MACHINERY, OR MAKE ANY IMPORTANT DECISIONS FOR AT LEAST 24 HOURS AFTER THE INFUSION.     2. Call the pain center at [phone] with any problems, questions, or concerns.     3. Eat light after the infusion. If you feel queasy or sick to your stomach, laying down with your eyes closed may help. When you resume eating start with something mild like clear liquids, yogurt, applesauce, crackers, etc… Gradually advance to a regular diet.     4. Do not leave your house alone the evening of your pain infusion.     5. No alcohol or sedative medications, such as sleeping pills, for 24 hours after your pain infusion.     6. Resume all other prescribed medications unless directed otherwise by you physician.     7. If you have any medical emergencies, call 911 or go directly to the closest emergency room.

## 2024-11-16 ENCOUNTER — LAB (OUTPATIENT)
Dept: LAB | Facility: LAB | Age: 59
End: 2024-11-16
Payer: COMMERCIAL

## 2024-11-16 DIAGNOSIS — L63.9 ALOPECIA AREATA, UNSPECIFIED: ICD-10-CM

## 2024-11-16 DIAGNOSIS — R63.5 ABNORMAL WEIGHT GAIN: Primary | ICD-10-CM

## 2024-11-16 LAB
ALBUMIN SERPL BCP-MCNC: 4.2 G/DL (ref 3.4–5)
ALP SERPL-CCNC: 58 U/L (ref 33–110)
ALT SERPL W P-5'-P-CCNC: 10 U/L (ref 7–45)
ANION GAP SERPL CALC-SCNC: 9 MMOL/L (ref 10–20)
AST SERPL W P-5'-P-CCNC: 13 U/L (ref 9–39)
BILIRUB SERPL-MCNC: 0.5 MG/DL (ref 0–1.2)
BUN SERPL-MCNC: 12 MG/DL (ref 6–23)
C PEPTIDE SERPL-MCNC: 1.5 NG/ML (ref 0.7–3.9)
CALCIUM SERPL-MCNC: 9.5 MG/DL (ref 8.6–10.3)
CHLORIDE SERPL-SCNC: 105 MMOL/L (ref 98–107)
CO2 SERPL-SCNC: 29 MMOL/L (ref 21–32)
CORTIS SERPL-MCNC: 12.8 UG/DL (ref 2.5–20)
CREAT SERPL-MCNC: 0.56 MG/DL (ref 0.5–1.05)
DHEA-S SERPL-MCNC: 30 UG/DL (ref 30–260)
EGFRCR SERPLBLD CKD-EPI 2021: >90 ML/MIN/1.73M*2
GLUCOSE SERPL-MCNC: 94 MG/DL (ref 74–99)
POTASSIUM SERPL-SCNC: 4.4 MMOL/L (ref 3.5–5.3)
PROT SERPL-MCNC: 6.7 G/DL (ref 6.4–8.2)
SODIUM SERPL-SCNC: 139 MMOL/L (ref 136–145)

## 2024-11-16 PROCEDURE — 82627 DEHYDROEPIANDROSTERONE: CPT

## 2024-11-16 PROCEDURE — 82533 TOTAL CORTISOL: CPT

## 2024-11-16 PROCEDURE — 80053 COMPREHEN METABOLIC PANEL: CPT

## 2024-11-16 PROCEDURE — 36415 COLL VENOUS BLD VENIPUNCTURE: CPT

## 2024-11-16 PROCEDURE — 84681 ASSAY OF C-PEPTIDE: CPT

## 2024-11-18 DIAGNOSIS — R19.7 DIARRHEA IN ADULT PATIENT: Primary | ICD-10-CM

## 2024-11-19 DIAGNOSIS — M25.512 LEFT SHOULDER PAIN, UNSPECIFIED CHRONICITY: ICD-10-CM

## 2024-11-26 ENCOUNTER — HOSPITAL ENCOUNTER (OUTPATIENT)
Dept: PAIN MEDICINE | Facility: CLINIC | Age: 59
Discharge: HOME | End: 2024-11-26
Payer: COMMERCIAL

## 2024-11-26 VITALS
TEMPERATURE: 99.1 F | WEIGHT: 180 LBS | OXYGEN SATURATION: 97 % | HEART RATE: 82 BPM | BODY MASS INDEX: 31.89 KG/M2 | RESPIRATION RATE: 16 BRPM | SYSTOLIC BLOOD PRESSURE: 117 MMHG | DIASTOLIC BLOOD PRESSURE: 72 MMHG

## 2024-11-26 DIAGNOSIS — M70.62 TROCHANTERIC BURSITIS OF LEFT HIP: ICD-10-CM

## 2024-11-26 PROCEDURE — 2550000001 HC RX 255 CONTRASTS: Performed by: ANESTHESIOLOGY

## 2024-11-26 PROCEDURE — 20610 DRAIN/INJ JOINT/BURSA W/O US: CPT | Performed by: ANESTHESIOLOGY

## 2024-11-26 PROCEDURE — 2500000004 HC RX 250 GENERAL PHARMACY W/ HCPCS (ALT 636 FOR OP/ED): Performed by: ANESTHESIOLOGY

## 2024-11-26 PROCEDURE — 77002 NEEDLE LOCALIZATION BY XRAY: CPT | Performed by: ANESTHESIOLOGY

## 2024-11-26 RX ORDER — TRIAMCINOLONE ACETONIDE 40 MG/ML
INJECTION, SUSPENSION INTRA-ARTICULAR; INTRAMUSCULAR AS NEEDED
Status: COMPLETED | OUTPATIENT
Start: 2024-11-26 | End: 2024-11-26

## 2024-11-26 RX ORDER — BUPIVACAINE HYDROCHLORIDE 7.5 MG/ML
INJECTION, SOLUTION EPIDURAL; RETROBULBAR AS NEEDED
Status: COMPLETED | OUTPATIENT
Start: 2024-11-26 | End: 2024-11-26

## 2024-11-26 RX ORDER — LIDOCAINE HYDROCHLORIDE 5 MG/ML
INJECTION, SOLUTION INFILTRATION; INTRAVENOUS AS NEEDED
Status: COMPLETED | OUTPATIENT
Start: 2024-11-26 | End: 2024-11-26

## 2024-11-26 ASSESSMENT — ENCOUNTER SYMPTOMS
LOSS OF SENSATION IN FEET: 0
DEPRESSION: 1
OCCASIONAL FEELINGS OF UNSTEADINESS: 1

## 2024-11-26 ASSESSMENT — PAIN - FUNCTIONAL ASSESSMENT
PAIN_FUNCTIONAL_ASSESSMENT: 0-10
PAIN_FUNCTIONAL_ASSESSMENT: 0-10

## 2024-11-26 ASSESSMENT — PAIN SCALES - GENERAL
PAINLEVEL_OUTOF10: 4
PAINLEVEL_OUTOF10: 3

## 2024-12-03 DIAGNOSIS — M79.7 FIBROMYALGIA: Primary | ICD-10-CM

## 2024-12-03 DIAGNOSIS — G62.89 OTHER POLYNEUROPATHY: ICD-10-CM

## 2024-12-03 RX ORDER — KETAMINE HCL IN NACL, ISO-OSM 100MG/10ML
SYRINGE (ML) INJECTION ONCE
Status: CANCELLED | OUTPATIENT
Start: 2024-12-05

## 2024-12-03 RX ORDER — KETOROLAC TROMETHAMINE 30 MG/ML
30 INJECTION, SOLUTION INTRAMUSCULAR; INTRAVENOUS ONCE
Status: CANCELLED | OUTPATIENT
Start: 2024-12-05 | End: 2024-12-05

## 2024-12-04 ENCOUNTER — TELEPHONE (OUTPATIENT)
Dept: GASTROENTEROLOGY | Facility: CLINIC | Age: 59
End: 2024-12-04
Payer: COMMERCIAL

## 2024-12-04 NOTE — TELEPHONE ENCOUNTER
All to [facility] to set up P2P for denial of CT of the abdomen and pelvis scheduled on 12/06. Case #[id number]. Dr Keshia Solomon will call Dr Metzger at 445 pm today.

## 2024-12-05 ENCOUNTER — INFUSION (OUTPATIENT)
Dept: INFUSION THERAPY | Facility: CLINIC | Age: 59
End: 2024-12-05
Payer: COMMERCIAL

## 2024-12-05 ENCOUNTER — TELEPHONE (OUTPATIENT)
Dept: GASTROENTEROLOGY | Facility: CLINIC | Age: 59
End: 2024-12-05
Payer: COMMERCIAL

## 2024-12-05 VITALS
TEMPERATURE: 97.7 F | HEART RATE: 78 BPM | RESPIRATION RATE: 16 BRPM | OXYGEN SATURATION: 94 % | SYSTOLIC BLOOD PRESSURE: 134 MMHG | DIASTOLIC BLOOD PRESSURE: 78 MMHG

## 2024-12-05 DIAGNOSIS — G62.89 OTHER POLYNEUROPATHY: ICD-10-CM

## 2024-12-05 DIAGNOSIS — M79.7 FIBROMYALGIA: ICD-10-CM

## 2024-12-05 DIAGNOSIS — R10.30 LOWER ABDOMINAL PAIN: Primary | ICD-10-CM

## 2024-12-05 PROCEDURE — 96365 THER/PROPH/DIAG IV INF INIT: CPT | Mod: INF

## 2024-12-05 PROCEDURE — 96368 THER/DIAG CONCURRENT INF: CPT | Mod: INF

## 2024-12-05 PROCEDURE — 2500000004 HC RX 250 GENERAL PHARMACY W/ HCPCS (ALT 636 FOR OP/ED): Performed by: ANESTHESIOLOGY

## 2024-12-05 PROCEDURE — 96375 TX/PRO/DX INJ NEW DRUG ADDON: CPT | Mod: INF

## 2024-12-05 PROCEDURE — 2500000004 HC RX 250 GENERAL PHARMACY W/ HCPCS (ALT 636 FOR OP/ED): Performed by: NURSE PRACTITIONER

## 2024-12-05 RX ORDER — ONDANSETRON HYDROCHLORIDE 2 MG/ML
4 INJECTION, SOLUTION INTRAVENOUS ONCE
OUTPATIENT
Start: 2024-12-19 | End: 2024-12-19

## 2024-12-05 RX ORDER — FAMOTIDINE 10 MG/ML
20 INJECTION INTRAVENOUS ONCE AS NEEDED
OUTPATIENT
Start: 2024-12-19

## 2024-12-05 RX ORDER — KETOROLAC TROMETHAMINE 30 MG/ML
30 INJECTION, SOLUTION INTRAMUSCULAR; INTRAVENOUS ONCE
OUTPATIENT
Start: 2024-12-19 | End: 2024-12-19

## 2024-12-05 RX ORDER — NITROGLYCERIN 0.4 MG/1
0.4 TABLET SUBLINGUAL ONCE
OUTPATIENT
Start: 2024-12-19 | End: 2024-12-19

## 2024-12-05 RX ORDER — METOPROLOL TARTRATE 25 MG/1
25 TABLET, FILM COATED ORAL ONCE
OUTPATIENT
Start: 2024-12-19 | End: 2024-12-19

## 2024-12-05 RX ORDER — DIPHENHYDRAMINE HYDROCHLORIDE 50 MG/ML
50 INJECTION INTRAMUSCULAR; INTRAVENOUS AS NEEDED
OUTPATIENT
Start: 2024-12-19

## 2024-12-05 RX ORDER — EPINEPHRINE 0.3 MG/.3ML
0.3 INJECTION SUBCUTANEOUS EVERY 5 MIN PRN
OUTPATIENT
Start: 2024-12-19

## 2024-12-05 RX ORDER — ONDANSETRON HYDROCHLORIDE 2 MG/ML
4 INJECTION, SOLUTION INTRAVENOUS ONCE
Status: COMPLETED | OUTPATIENT
Start: 2024-12-05 | End: 2024-12-05

## 2024-12-05 RX ORDER — KETAMINE HCL IN NACL, ISO-OSM 100MG/10ML
SYRINGE (ML) INJECTION ONCE
OUTPATIENT
Start: 2024-12-19

## 2024-12-05 RX ORDER — KETAMINE HCL IN NACL, ISO-OSM 100MG/10ML
SYRINGE (ML) INJECTION ONCE
Status: COMPLETED | OUTPATIENT
Start: 2024-12-05 | End: 2024-12-05

## 2024-12-05 RX ORDER — KETOROLAC TROMETHAMINE 30 MG/ML
30 INJECTION, SOLUTION INTRAMUSCULAR; INTRAVENOUS ONCE
Status: COMPLETED | OUTPATIENT
Start: 2024-12-05 | End: 2024-12-05

## 2024-12-05 RX ORDER — ALBUTEROL SULFATE 0.83 MG/ML
3 SOLUTION RESPIRATORY (INHALATION) AS NEEDED
OUTPATIENT
Start: 2024-12-19

## 2024-12-05 ASSESSMENT — PAIN SCALES - GENERAL: PAINLEVEL_OUTOF10: 6

## 2024-12-05 ASSESSMENT — ENCOUNTER SYMPTOMS
OCCASIONAL FEELINGS OF UNSTEADINESS: 1
LOSS OF SENSATION IN FEET: 0
DEPRESSION: 0

## 2024-12-05 ASSESSMENT — COLUMBIA-SUICIDE SEVERITY RATING SCALE - C-SSRS
6. HAVE YOU EVER DONE ANYTHING, STARTED TO DO ANYTHING, OR PREPARED TO DO ANYTHING TO END YOUR LIFE?: NO
1. IN THE PAST MONTH, HAVE YOU WISHED YOU WERE DEAD OR WISHED YOU COULD GO TO SLEEP AND NOT WAKE UP?: NO
2. HAVE YOU ACTUALLY HAD ANY THOUGHTS OF KILLING YOURSELF?: NO

## 2024-12-05 NOTE — PROGRESS NOTES
1410- assumed care of pt, pt sitting in chair tolerating infusion well.     1626- Post infusion teaching provided. Patient verbalized understanding. VSS, Patient states pain is 6. Will assist patient to waiting car via wheelchair.

## 2024-12-05 NOTE — PATIENT INSTRUCTIONS
Today :We administered ketamine 30 mg-lidocaine 300 mg, propofol, ketorolac, and ondansetron.     For:   1. Fibromyalgia    2. Other polyneuropathy         Your next appointment is due in:  SEE AVS        Please read the  Medication Guide that was given to you and reviewed during todays visit.     (Tell all doctors including dentists that you are taking this medication)     Go to the emergency room or call 911 if:  -You have signs of allergic reaction:   -Rash, hives, itching.   -Swollen, blistered, peeling skin.   -Swelling of face, lips, mouth, tongue or throat.   -Tightness of chest, trouble breathing, swallowing or talking     Call your doctor:  - If IV / injection site gets red, warm, swollen, itchy or leaks fluid or pus.     (Leave dressing on your IV site for at least 2 hours and keep area clean and dry  - If you get sick or have symptoms of infection or are not feeling well for any reason.    (Wash your hands often, stay away from people who are sick)  - If you have side effects from your medication that do not go away or are bothersome.     (Refer to the teaching your nurse gave you for side effects to call your doctor about)    - Common side effects may include:  stuffy nose, headache, feeling tired, muscle aches, upset stomach  - Before receiving any vaccines     - Call [facility] at   If:  - You get sick, are on antibiotics, have had a recent vaccine, have surgery or dental work and your doctor wants your visit rescheduled.  - You need to cancel and reschedule your visit for any reason. Call at least 2 days before your visit if you need to cancel.   - Your insurance changes before your next visit.    (We will need to get approval from your new insurance. This can take up to two weeks.)     [facility] is opened Monday thru Friday. We are closed on weekends and holidays.   Voice mail will take your call if the center is closed. If you leave a message please allow  24 hours for a call back during weekdays. If you leave a message on a weekend/holiday, we will call you back the next business day.    A pharmacist is available Monday - Friday from 8:30AM to 3:30PM to help answer any questions you may have about your prescriptions(s). Please call pharmacy at:    [facility]: [phone]  [facility]: [phone]  [facility]: [phone]              [facility] OUTPATIENT [place]      Pain Infusion Aftercare Instructions      1. It is normal to feel sedated, tired and low in energy after a pain infusion. DO NOT DRIVE, OPERATE ANY MACHINERY, OR MAKE ANY IMPORTANT DECISIONS FOR AT LEAST 24 HOURS AFTER THE INFUSION.     2. Call the pain center at [phone] with any problems, questions, or concerns.     3. Eat light after the infusion. If you feel queasy or sick to your stomach, laying down with your eyes closed may help. When you resume eating start with something mild like clear liquids, yogurt, applesauce, crackers, etc… Gradually advance to a regular diet.     4. Do not leave your house alone the evening of your pain infusion.     5. No alcohol or sedative medications, such as sleeping pills, for 24 hours after your pain infusion.     6. Resume all other prescribed medications unless directed otherwise by you physician.     7. If you have any medical emergencies, call 911 or go directly to the closest emergency room.

## 2024-12-05 NOTE — TELEPHONE ENCOUNTER
Call to patient. Relayed to her that her insurance denied the CT of her abdomen and want her to have an US of the abdomen first. Relayed to her that Dr Metzger can order the US of the abdomen first or cancel the CT of the abdomen completely. She wants to have the US of the abdomen. I told her that the order will get put in and that someone in scheduling in that dept will call her to schedule. I also told her I would cancel the CT for 12/06 but the order will remain if she needs to have it done. She voiced understanding and has no question

## 2024-12-05 NOTE — PROGRESS NOTES
S: Patient here for 4th opioid sparing pain infusion. Patient reports 50% reduction in pain after last infusion that lasted 3 days.    Purpose of pain infusion meds explained along with potential side effects.  Patient verbalized understanding.    B: Pain Issues. everywhere    Is Patient breast feeding: NO    A: Patient currently has pain described on flow sheet documentation. Designated  is rAY. Patient last ate solid food >12 hours ago, and had liquid 1 hours ago.    R: Plan; Obtain IV access, do patient risk assessment, and start opioid sparing infusion as ordered. Monitoring for S/S of adverse reactions.

## 2024-12-06 ENCOUNTER — APPOINTMENT (OUTPATIENT)
Dept: RADIOLOGY | Facility: HOSPITAL | Age: 59
End: 2024-12-06
Payer: COMMERCIAL

## 2024-12-10 ENCOUNTER — HOSPITAL ENCOUNTER (OUTPATIENT)
Dept: RADIOLOGY | Facility: HOSPITAL | Age: 59
Discharge: HOME | End: 2024-12-10
Payer: COMMERCIAL

## 2024-12-10 DIAGNOSIS — M25.512 LEFT SHOULDER PAIN, UNSPECIFIED CHRONICITY: ICD-10-CM

## 2024-12-10 PROCEDURE — 73221 MRI JOINT UPR EXTREM W/O DYE: CPT | Mod: LEFT SIDE | Performed by: RADIOLOGY

## 2024-12-10 PROCEDURE — 73221 MRI JOINT UPR EXTREM W/O DYE: CPT | Mod: LT

## 2024-12-17 DIAGNOSIS — M79.7 FIBROMYALGIA: Primary | ICD-10-CM

## 2024-12-17 DIAGNOSIS — G62.89 OTHER POLYNEUROPATHY: ICD-10-CM

## 2024-12-19 ENCOUNTER — INFUSION (OUTPATIENT)
Dept: INFUSION THERAPY | Facility: CLINIC | Age: 59
End: 2024-12-19
Payer: COMMERCIAL

## 2024-12-19 VITALS
OXYGEN SATURATION: 96 % | DIASTOLIC BLOOD PRESSURE: 74 MMHG | SYSTOLIC BLOOD PRESSURE: 121 MMHG | RESPIRATION RATE: 12 BRPM | HEART RATE: 71 BPM | TEMPERATURE: 97.5 F

## 2024-12-19 DIAGNOSIS — G62.89 OTHER POLYNEUROPATHY: ICD-10-CM

## 2024-12-19 DIAGNOSIS — M79.7 FIBROMYALGIA: ICD-10-CM

## 2024-12-19 PROCEDURE — 2500000004 HC RX 250 GENERAL PHARMACY W/ HCPCS (ALT 636 FOR OP/ED): Performed by: NURSE PRACTITIONER

## 2024-12-19 PROCEDURE — 2500000004 HC RX 250 GENERAL PHARMACY W/ HCPCS (ALT 636 FOR OP/ED): Performed by: ANESTHESIOLOGY

## 2024-12-19 PROCEDURE — 96368 THER/DIAG CONCURRENT INF: CPT | Mod: INF

## 2024-12-19 PROCEDURE — 96365 THER/PROPH/DIAG IV INF INIT: CPT | Mod: INF

## 2024-12-19 PROCEDURE — 96375 TX/PRO/DX INJ NEW DRUG ADDON: CPT | Mod: INF

## 2024-12-19 RX ORDER — EPINEPHRINE 0.3 MG/.3ML
0.3 INJECTION SUBCUTANEOUS EVERY 5 MIN PRN
OUTPATIENT
Start: 2025-01-02

## 2024-12-19 RX ORDER — KETOROLAC TROMETHAMINE 30 MG/ML
30 INJECTION, SOLUTION INTRAMUSCULAR; INTRAVENOUS ONCE
OUTPATIENT
Start: 2025-01-02 | End: 2025-01-02

## 2024-12-19 RX ORDER — DIPHENHYDRAMINE HYDROCHLORIDE 50 MG/ML
50 INJECTION INTRAMUSCULAR; INTRAVENOUS AS NEEDED
OUTPATIENT
Start: 2025-01-02

## 2024-12-19 RX ORDER — NITROGLYCERIN 0.4 MG/1
0.4 TABLET SUBLINGUAL ONCE
OUTPATIENT
Start: 2025-01-02 | End: 2025-01-02

## 2024-12-19 RX ORDER — METOPROLOL TARTRATE 25 MG/1
25 TABLET, FILM COATED ORAL ONCE
OUTPATIENT
Start: 2025-01-02 | End: 2025-01-02

## 2024-12-19 RX ORDER — ONDANSETRON HYDROCHLORIDE 2 MG/ML
4 INJECTION, SOLUTION INTRAVENOUS ONCE
OUTPATIENT
Start: 2025-01-02 | End: 2025-01-02

## 2024-12-19 RX ORDER — FAMOTIDINE 10 MG/ML
20 INJECTION INTRAVENOUS ONCE AS NEEDED
OUTPATIENT
Start: 2025-01-02

## 2024-12-19 RX ORDER — KETAMINE HCL IN NACL, ISO-OSM 100MG/10ML
SYRINGE (ML) INJECTION ONCE
Status: COMPLETED | OUTPATIENT
Start: 2024-12-19 | End: 2024-12-19

## 2024-12-19 RX ORDER — KETOROLAC TROMETHAMINE 30 MG/ML
30 INJECTION, SOLUTION INTRAMUSCULAR; INTRAVENOUS ONCE
Status: COMPLETED | OUTPATIENT
Start: 2024-12-19 | End: 2024-12-19

## 2024-12-19 RX ORDER — ALBUTEROL SULFATE 0.83 MG/ML
3 SOLUTION RESPIRATORY (INHALATION) AS NEEDED
OUTPATIENT
Start: 2025-01-02

## 2024-12-19 RX ORDER — KETAMINE HCL IN NACL, ISO-OSM 100MG/10ML
SYRINGE (ML) INJECTION ONCE
OUTPATIENT
Start: 2025-01-02

## 2024-12-19 RX ORDER — ONDANSETRON HYDROCHLORIDE 2 MG/ML
4 INJECTION, SOLUTION INTRAVENOUS ONCE
Status: COMPLETED | OUTPATIENT
Start: 2024-12-19 | End: 2024-12-19

## 2024-12-19 ASSESSMENT — PAIN SCALES - GENERAL
PAINLEVEL_OUTOF10: 2
PAINLEVEL_OUTOF10: 5 - MODERATE PAIN
PAINLEVEL_OUTOF10: 5

## 2024-12-19 ASSESSMENT — ENCOUNTER SYMPTOMS
OCCASIONAL FEELINGS OF UNSTEADINESS: 1
DEPRESSION: 1
LOSS OF SENSATION IN FEET: 0

## 2024-12-19 NOTE — PATIENT INSTRUCTIONS
Today :We administered ketamine 30 mg-lidocaine 300 mg, propofol, and ketorolac.     For:   1. Fibromyalgia    2. Other polyneuropathy          (Tell all doctors including dentists that you are taking this medication)     Go to the emergency room or call 911 if:  -You have signs of allergic reaction:   -Rash, hives, itching.   -Swollen, blistered, peeling skin.   -Swelling of face, lips, mouth, tongue or throat.   -Tightness of chest, trouble breathing, swallowing or talking     Call your doctor:  - If IV / injection site gets red, warm, swollen, itchy or leaks fluid or pus.     (Leave dressing on your IV site for at least 2 hours and keep area clean and dry  - If you get sick or have symptoms of infection or are not feeling well for any reason.    (Wash your hands often, stay away from people who are sick)  - If you have side effects from your medication that do not go away or are bothersome.     (Refer to the teaching your nurse gave you for side effects to call your doctor about)    - Common side effects may include:  stuffy nose, headache, feeling tired, muscle aches, upset stomach  - Before receiving any vaccines     - Call [facility] at   If:  - You get sick, are on antibiotics, have had a recent vaccine, have surgery or dental work and your doctor wants your visit rescheduled.  - You need to cancel and reschedule your visit for any reason. Call at least 2 days before your visit if you need to cancel.   - Your insurance changes before your next visit.    (We will need to get approval from your new insurance. This can take up to two weeks.)     [facility] is opened Monday thru Friday. We are closed on weekends and holidays.   Voice mail will take your call if the center is closed. If you leave a message please allow 24 hours for a call back during weekdays. If you leave a message on a weekend/holiday, we will call you back the next business day.    A pharmacist is available Monday  - Friday from 8:30AM to 3:30PM to help answer any questions you may have about your prescriptions(s). Please call pharmacy at:    [facility]: [phone]  [facility]: [phone]  [facility]: [phone]              [facility]      Pain Infusion Aftercare Instructions      1. It is normal to feel sedated, tired and low in energy after a pain infusion. DO NOT DRIVE, OPERATE ANY MACHINERY, OR MAKE ANY IMPORTANT DECISIONS FOR AT LEAST 24 HOURS AFTER THE INFUSION.     2. Call the pain center at [phone] with any problems, questions, or concerns.     3. Eat light after the infusion. If you feel queasy or sick to your stomach, laying down with your eyes closed may help. When you resume eating start with something mild like clear liquids, yogurt, applesauce, crackers, etc… Gradually advance to a regular diet.     4. Do not leave your house alone the evening of your pain infusion.     5. No alcohol or sedative medications, such as sleeping pills, for 24 hours after your pain infusion.     6. Resume all other prescribed medications unless directed otherwise by you physician.     7. If you have any medical emergencies, call 911 or go directly to the closest emergency room.

## 2024-12-19 NOTE — PROGRESS NOTES
S: Patient here for 5th opioid sparing pain infusion. Patient reports 65% reduction in pain after last infusion that lasted 10 days.    Purpose of pain infusion meds explained along with potential side effects.  Patient verbalized understanding.    B: Pain Issues in neck, feet     A: Patient currently has pain described on flow sheet documentation. Designated  is Amos Zuletae at [phone]. Patient last ate solid food >4 hours ago, and had liquid 1 hours ago.    R: Plan; Obtain IV access, do patient risk assessment, and start opioid sparing infusion as ordered. Monitoring for S/S of adverse reactions.    1650- Post infusion teaching provided. Patient verbalized understanding. VSS, Patient states pain is 2. Will assist patient to waiting car via wheelchair.

## 2025-01-10 DIAGNOSIS — G62.89 OTHER POLYNEUROPATHY: ICD-10-CM

## 2025-01-10 DIAGNOSIS — M79.7 FIBROMYALGIA: Primary | ICD-10-CM

## 2025-01-10 RX ORDER — KETAMINE HCL IN NACL, ISO-OSM 100MG/10ML
SYRINGE (ML) INJECTION ONCE
OUTPATIENT
Start: 2025-01-14

## 2025-01-10 RX ORDER — KETOROLAC TROMETHAMINE 30 MG/ML
30 INJECTION, SOLUTION INTRAMUSCULAR; INTRAVENOUS ONCE
OUTPATIENT
Start: 2025-01-14 | End: 2025-01-14

## 2025-01-14 ENCOUNTER — APPOINTMENT (OUTPATIENT)
Dept: PAIN MEDICINE | Facility: CLINIC | Age: 60
End: 2025-01-14
Payer: COMMERCIAL

## 2025-01-14 ENCOUNTER — APPOINTMENT (OUTPATIENT)
Dept: INFUSION THERAPY | Facility: CLINIC | Age: 60
End: 2025-01-14
Payer: COMMERCIAL

## 2025-01-16 ENCOUNTER — APPOINTMENT (OUTPATIENT)
Dept: PAIN MEDICINE | Facility: CLINIC | Age: 60
End: 2025-01-16
Payer: COMMERCIAL

## 2025-01-16 VITALS
RESPIRATION RATE: 10 BRPM | TEMPERATURE: 96.1 F | OXYGEN SATURATION: 97 % | HEIGHT: 63 IN | HEART RATE: 87 BPM | BODY MASS INDEX: 31.89 KG/M2 | WEIGHT: 180 LBS | DIASTOLIC BLOOD PRESSURE: 68 MMHG | SYSTOLIC BLOOD PRESSURE: 112 MMHG

## 2025-01-16 DIAGNOSIS — M25.551 ACUTE RIGHT HIP PAIN: ICD-10-CM

## 2025-01-16 DIAGNOSIS — M25.511 RIGHT SHOULDER PAIN, UNSPECIFIED CHRONICITY: Primary | ICD-10-CM

## 2025-01-16 DIAGNOSIS — M79.7 FIBROMYALGIA: ICD-10-CM

## 2025-01-16 PROBLEM — F45.8 BRUXISM: Status: ACTIVE | Noted: 2024-07-26

## 2025-01-16 PROBLEM — G43.909 MIGRAINE: Status: ACTIVE | Noted: 2024-11-14

## 2025-01-16 PROBLEM — M70.049: Status: ACTIVE | Noted: 2024-09-16

## 2025-01-16 PROBLEM — J31.0 RHINITIS: Status: ACTIVE | Noted: 2024-07-26

## 2025-01-16 PROBLEM — G47.30 SLEEP APNEA: Status: ACTIVE | Noted: 2024-11-14

## 2025-01-16 PROBLEM — Z98.890 HISTORY OF THUMB SURGERY: Status: ACTIVE | Noted: 2024-09-16

## 2025-01-16 PROCEDURE — G2212 PROLONG OUTPT/OFFICE VIS: HCPCS | Performed by: NURSE PRACTITIONER

## 2025-01-16 PROCEDURE — 1036F TOBACCO NON-USER: CPT | Performed by: NURSE PRACTITIONER

## 2025-01-16 PROCEDURE — 99417 PROLNG OP E/M EACH 15 MIN: CPT | Performed by: NURSE PRACTITIONER

## 2025-01-16 PROCEDURE — 99215 OFFICE O/P EST HI 40 MIN: CPT | Performed by: NURSE PRACTITIONER

## 2025-01-16 PROCEDURE — 3008F BODY MASS INDEX DOCD: CPT | Performed by: NURSE PRACTITIONER

## 2025-01-16 ASSESSMENT — ENCOUNTER SYMPTOMS
DIZZINESS: 0
CHILLS: 0
FEVER: 0
LOSS OF SENSATION IN FEET: 0
ARTHRALGIAS: 1
PALPITATIONS: 0
BACK PAIN: 1
AGITATION: 0
FREQUENCY: 0
CONSTIPATION: 0
DIARRHEA: 0
CONFUSION: 0
NECK PAIN: 1
MYALGIAS: 1
CHEST TIGHTNESS: 0
SHORTNESS OF BREATH: 0
NAUSEA: 0
SEIZURES: 0
HEADACHES: 0
DEPRESSION: 0
OCCASIONAL FEELINGS OF UNSTEADINESS: 1
WHEEZING: 0

## 2025-01-16 ASSESSMENT — PAIN SCALES - GENERAL
PAINLEVEL_OUTOF10: 7
PAINLEVEL_OUTOF10: 7

## 2025-01-16 ASSESSMENT — PATIENT HEALTH QUESTIONNAIRE - PHQ9
1. LITTLE INTEREST OR PLEASURE IN DOING THINGS: NOT AT ALL
2. FEELING DOWN, DEPRESSED OR HOPELESS: NOT AT ALL
SUM OF ALL RESPONSES TO PHQ9 QUESTIONS 1 & 2: 0

## 2025-01-16 ASSESSMENT — PAIN DESCRIPTION - DESCRIPTORS: DESCRIPTORS: SHARP;BURNING

## 2025-01-16 ASSESSMENT — PAIN - FUNCTIONAL ASSESSMENT: PAIN_FUNCTIONAL_ASSESSMENT: 0-10

## 2025-01-16 NOTE — PROGRESS NOTES
"Chief Complain  Follow-up cervical lumbar, and bilateral knee pain and widespread muscle and joint pain.    History Of Present Illness  Gema High \"Natacha\" is a 59 y.o. female here for cervical, right shoulder lumbar, right hip pain. The patient rates the pain at 5 and on a scale from 0-10.  The patient describes pain as sharp, aching, radiating, throbbing, burning.  The pain is worsened by bending forward, standing, walking, lifting, twisting, squatting, and most movement, typing at her desk  and is alleviated by medications nonsteroidal anti-inflammatory drugs, Tylenol, and prescribed pain medications, position change, and sitting.  Since the last visit the right hip and right shoulder right knee pain has worsened due to recent fall up stairs.  He did not seek medical attention.  Uses the occasional tramadol for severe breakthrough pain.  The patient denies any fever, chills, weight loss, weakness, bladder/ bowel incontinence, history of cancer, history of IV drug abuse, recent trauma.     Prior office visit:  11/11/2024  Gema High is a 59 y.o. female recalled past medical history of obesity BMI 30, smoker, depression, fibromyalgia, irritable syndrome, gastroparesis, bilateral lower extremity EMG normal, lumbar MRI no foraminal or canal stenosis noted.  Who is here for follow-up for IV infusion therapy for widespread fibromyalgia pain.  She complains of cervical lumbar bilateral hip and bilateral knee pain.  She was trialed on IV infusion therapy which provides 30 to 40% relief for roughly 3 to 4 days.  She has not had her infusion therapy consistently every 2 weeks it has been spaced out over a month, the plan is continue to get every 2 weeks therapy to improve symptom relief.  Of note patient received bilateral knee corticosteroid injections with minimal relief in symptoms.  She endorses left hip pain and has difficulty laying on that side.  She works long hours sitting in front of a computer which does not " improve her symptoms.  On physical examination she has pain on palpation to left greater trochanteric bursa, consistent with bursitis.  Left hip full range of motion.  Bilateral lumbar paraspinals tender to palpation, bilateral lower extremities vibratory sensation reflex intact.  She denies any lumbar radiculopathy symptoms.  She denies any bowel or bladder and continence or saddle paresthesia. I spent time with the patient discussing all of the risks, benefits, and alternatives to this measure. Including but not limited to hematoma/abscess, and steroid effects. The patient understands and will schedule a repeat as needed.   Plan to continue with IV infusion therapy every 2 weeks to improve widespread fibromyalgia pain.  Discussed repeating bilateral knee injections in the future if symptoms not improving.  Patient verbalized understand plan of care.     Procedures:  11/26/2024 left GT bursa steroid injection the patient has had a 75% improvement in pain and function  5/7/2024 left lumbar L3-4 lumbar sympathetic nerve block       Portions of record reviewed for pertinent issues: active problem list, medication list, allergies, family history, social history, notes from last encounter, encounters, lab results, imaging and other available records.      I have personally reviewed the OARRS report for this patient. This report is scanned into the electronic medical record. I have considered the risks of abuse, dependence, addiction and diversion. It showed:  Gabapentin, Adderall Dr. Pagan  Aberrant behavior: None    Past Medical History  She has a past medical history of Acute pharyngitis, unspecified (10/21/2014), Anxiety disorder, unspecified, Chronic rhinitis (09/08/2015), CTS (carpal tunnel syndrome) (2022), Fibrocystic breast (1989), Fibroid (Recurring), Furuncle of buttock (09/06/2017), Major depressive disorder, recurrent, unspecified, Migraine (2008), Other malaise (07/09/2020), Personal history of diseases of  the skin and subcutaneous tissue, Personal history of other diseases of the digestive system (11/24/2014), Personal history of other diseases of the musculoskeletal system and connective tissue, Personal history of other diseases of the respiratory system (12/06/2021), Pneumonia, unspecified organism (10/06/2022), and Rheumatoid arthritis (1997).    Surgical History  She has a past surgical history that includes Tubal ligation (01/04/2013); Breast surgery (01/04/2013); Other surgical history (01/04/2013); Other surgical history (01/04/2013); Other surgical history (01/04/2013); Esophagogastroduodenoscopy (01/04/2013); Colonoscopy w/ polypectomy (01/04/2013); Hysteroscopy (02/19/2015); and Reduction mammaplasty.     Social History  She reports that she quit smoking about 2 years ago. Her smoking use included cigarettes. She started smoking about 43 years ago. She has a 43 pack-year smoking history. She has been exposed to tobacco smoke. She has never used smokeless tobacco. She reports that she does not currently use alcohol. She reports that she does not use drugs.    Family History  Family History   Problem Relation Name Age of Onset    Diabetes Mother Jona Adamson     Epilepsy Mother Jona Adamson         and recurrent seizures    Hypertension Mother Jona Adamson         essential htn    Other (Bronchitis) Mother Jona Adamson     Pancreatic cancer Mother Jona Adamson     Arthritis Mother Jona Adamson     Diabetes Father Mahmud Bakr     Other (cardiac disorder) Father Mahmud Bakr     Other (stroke syndrome) Father Mahmud Bakr     Other (bronchitis) Daughter      Breast cancer Father's Sister  50 - 59        Allergies  Adalimumab, Duloxetine, Escitalopram oxalate, Esomeprazole, Fluoxetine, Omeprazole, Pantoprazole, Topiramate, Venlafaxine, and Wellbutrin [bupropion hcl]    Review of Systems  Review of Systems   Constitutional:  Negative for chills and fever.   Respiratory:  Negative for chest tightness,  shortness of breath and wheezing.    Cardiovascular:  Negative for chest pain and palpitations.   Gastrointestinal:  Negative for constipation, diarrhea and nausea.   Genitourinary:  Negative for frequency and urgency.   Musculoskeletal:  Positive for arthralgias, back pain, myalgias and neck pain.        Cervical pain bilateral shoulder scapula pain right side worse than left now.  Lumbar back pain, right hip lateral thigh pain due to recent fall bilateral knee pain left worse than right, followed by Dr. Coughlin gets cortisone injections   Neurological:  Negative for dizziness, seizures and headaches.   Psychiatric/Behavioral:  Negative for agitation, confusion and suicidal ideas.         Physical Exam  Physical Exam  Constitutional:       General: She is not in acute distress.     Appearance: Normal appearance.          Comments: Cervical decreased range of motion flexion-extension lateral bending.  Pain on palpation of bilateral trapezius right rhomboid tender to palpation, right shoulder full range of motion pain on palpation to suprascapular region.  Lumbar decreased range of motion flexion extension, lateral bending.  Tenderness to bilateral lumbar paraspinals, right lateral thigh allodynia hyperesthesia, denies numbness and tingling bilateral lower extremities, right hip full range of motion.  Tenderness to gluteal and right lateral thigh.  Denies any bowel or bladder incontinence or saddle paresthesia.   HENT:      Head: Normocephalic.   Eyes:      Extraocular Movements: Extraocular movements intact.   Musculoskeletal:      Cervical back: Spasms and tenderness present. Pain with movement and muscular tenderness present. Decreased range of motion.      Lumbar back: Spasms and tenderness present. Decreased range of motion. Negative right straight leg raise test and negative left straight leg raise test.   Neurological:      General: No focal deficit present.      Mental Status: She is alert and oriented to person,  "place, and time.      GCS: GCS eye subscore is 4. GCS verbal subscore is 5. GCS motor subscore is 6.      Cranial Nerves: Cranial nerves 2-12 are intact.      Sensory: Sensation is intact.      Motor: Motor function is intact.      Coordination: Coordination is intact.      Gait: Gait is intact.      Deep Tendon Reflexes: Reflexes normal.   Psychiatric:         Attention and Perception: Attention and perception normal.         Mood and Affect: Mood normal.         Speech: Speech normal.         Behavior: Behavior normal.         Thought Content: Thought content normal.         Judgment: Judgment normal.           Last Recorded Vitals  Blood pressure 112/68, pulse 87, temperature 35.6 °C (96.1 °F), temperature source Temporal, resp. rate 10, height 1.6 m (5' 3\"), weight 81.6 kg (180 lb), SpO2 97%, not currently breastfeeding.    Reviewed Images  5/29/2024 MRI cervical spine showed C3-C7 anterior fusion no significant canal stenosis mild neuroforaminal stenosis at C7-T1:  FINDINGS:  There are postsurgical changes of anterior fusion and discectomy at  C3 through C7 with associated metallic susceptibility artifact.      Alignment: The vertebral alignment is within normal limits.      Vertebrae/Intervertebral Discs: The vertebral bodies demonstrate  expected height.  The marrow signal is within normal limits. There is  desiccated disc signal at C2-C3 and within the upper thoracic spine.      Cord: Normal in caliber and signal.      C1-C2: The cervicomedullary junction appears unremarkable. There is  no spinal canal stenosis.      C2-C3: No spinal canal or neural foraminal stenosis.      C3-C4: Mild disc osteophyte complex and uncovertebral joint  hypertrophy. No spinal canal stenosis. No neural foraminal stenosis.      C4-C5: No spinal canal or neural foraminal stenosis.      C5-C6: No spinal canal or neural foraminal stenosis.      C6-C7: No spinal canal or neural foraminal stenosis.      C7-T1: Mild disc osteophyte " complex and uncovertebral joint  hypertrophy and facet arthrosis. No spinal canal stenosis. Mild  neural foraminal stenosis.      The upper thoracic vertebrae and spinal canal are unremarkable.      The prevertebral and posterior paraspinous soft tissues are within  normal limits.      IMPRESSION:  Postsurgical changes of anterior fusion and discectomy at C3 through  C7.      No significant spinal canal stenosis. Mild neural foraminal stenosis  at C7-T1.      MACRO:  None      Signed by: Carmen Schaefer 5/29/2024 11:28 AM        3/5/2024 bilateral lower extremity EMG were normal     2/28/2024 MRI lumbar spine report minimal degenerative changes  FINDINGS:  There is a transitional lumbosacral vertebral body which is  lumbarized on the right and sacralized on the left and for the sake  of this dictation will be labeled L5.      The coronal  images demonstrate a mild dextrocurvature of the  lumbar spine.      There is minimal retrolisthesis of L1 on L2.      There are degenerative signal changes noted along endplates within  lumbar region.      The visualized spinal cord demonstrates no signal abnormality within  it.  The conus medullaris is normally positioned terminating at the  L1/2 level.      There is diminished disc signal at the L1/2 through L4/5 levels  compatible with degenerative disc disease.      At the L5/S1 level,  there is no significant spinal canal stenosis or  neuroforaminal stenosis.      At the L4/L5 level,  there is a posterior disc bulge along with  degenerative facet changes and ligamentum flavum hypertrophy  contributing to mild spinal canal narrowing. There is moderate right  and mild-to-moderate left-sided neural foraminal narrowing.      At the L3/L4 level,  there is a mild posterior disc bulge along with  degenerative facet changes and ligamentum flavum hypertrophy  contributing to mild spinal canal narrowing. There is  mild-to-moderate encroachment upon the neural foramen bilaterally.       At the L2/L3 level,  there is a posterior disc bulge along with  degenerative facet changes and ligamentum flavum hypertrophy  contributing to mild spinal canal narrowing. There is moderate  encroachment upon the neural foramen bilaterally.      At the L1/L2 level,  there is minimal retrolisthesis of L1 on L2, a  posterior disc bulge along with degenerative facet changes and  ligamentum flavum hypertrophy contributing to mild spinal canal  narrowing. There is moderate right and mild-to-moderate left-sided  neural foraminal narrowing.      At the T12/L1 level,  there is a small central disc herniation  measuring 1 mm in AP dimension without significant spinal canal or  neural foraminal narrowing.      At the T11/12 level, there are degenerative facet changes without  significant spinal canal narrowing. There is moderate encroachment  upon the neural foramen bilaterally.      At the T10/11 level, there is a posterior disc/osteophyte complex  contributing to mild spinal canal narrowing. There are degenerative  facet changes. There is mild-to-moderate encroachment upon the neural foramen bilaterally.          IMPRESSION:  There is a transitional lumbosacral vertebral body which is  lumbarized on the right and sacralized on the left and for the sake  of this dictation will be labeled L5.      The coronal  images demonstrate a mild dextrocurvature of the  lumbar spine.      There is minimal retrolisthesis of L1 on L2.      There is multilevel spondylosis with varying degrees of spinal canal  and neural foraminal narrowing as described above.  Data reviewed:  FINDINGS:  There is a transitional lumbosacral vertebral body which is  lumbarized on the right and sacralized on the left and for the sake  of this dictation will be labeled L5.      The coronal  images demonstrate a mild dextrocurvature of the  lumbar spine.      There is minimal retrolisthesis of L1 on L2.      There are degenerative signal changes  noted along endplates within  lumbar region.      The visualized spinal cord demonstrates no signal abnormality within  it.  The conus medullaris is normally positioned terminating at the  L1/2 level.      There is diminished disc signal at the L1/2 through L4/5 levels  compatible with degenerative disc disease.      At the L5/S1 level,  there is no significant spinal canal stenosis or  neuroforaminal stenosis.      At the L4/L5 level,  there is a posterior disc bulge along with  degenerative facet changes and ligamentum flavum hypertrophy  contributing to mild spinal canal narrowing. There is moderate right  and mild-to-moderate left-sided neural foraminal narrowing.      At the L3/L4 level,  there is a mild posterior disc bulge along with  degenerative facet changes and ligamentum flavum hypertrophy  contributing to mild spinal canal narrowing. There is  mild-to-moderate encroachment upon the neural foramen bilaterally.      At the L2/L3 level,  there is a posterior disc bulge along with  degenerative facet changes and ligamentum flavum hypertrophy  contributing to mild spinal canal narrowing. There is moderate  encroachment upon the neural foramen bilaterally.      At the L1/L2 level,  there is minimal retrolisthesis of L1 on L2, a  posterior disc bulge along with degenerative facet changes and  ligamentum flavum hypertrophy contributing to mild spinal canal  narrowing. There is moderate right and mild-to-moderate left-sided  neural foraminal narrowing.      At the T12/L1 level,  there is a small central disc herniation  measuring 1 mm in AP dimension without significant spinal canal or  neural foraminal narrowing.      At the T11/12 level, there are degenerative facet changes without  significant spinal canal narrowing. There is moderate encroachment  upon the neural foramen bilaterally.      At the T10/11 level, there is a posterior disc/osteophyte complex  contributing to mild spinal canal narrowing. There are  "degenerative  facet changes. There is mild-to-moderate encroachment upon the neural  foramen bilaterally.          IMPRESSION:  There is a transitional lumbosacral vertebral body which is  lumbarized on the right and sacralized on the left and for the sake  of this dictation will be labeled L5.      The coronal  images demonstrate a mild dextrocurvature of the  lumbar spine.      There is minimal retrolisthesis of L1 on L2.      There is multilevel spondylosis with varying degrees of spinal canal  and neural foraminal narrowing as described above.        Reviewed Labs  Lab Results   Component Value Date    GLUCOSE 94 11/16/2024    CALCIUM 9.5 11/16/2024     11/16/2024    K 4.4 11/16/2024    CO2 29 11/16/2024     11/16/2024    BUN 12 11/16/2024    CREATININE 0.56 11/16/2024        Assessment/Plan     Gema High \"Natacha\" is a pleasant 59 y.o. female call past medical history of recall past medical history of obesity BMI 30, smoker, depression, fibromyalgia, irritable syndrome, gastroparesis, bilateral lower extremity EMG normal, lumbar MRI no foraminal or canal stenosis noted, left knee osteoarthritis, cervical fusion C3-C7.  Who is here for follow-up for cervical pain radiating to right shoulder lumbar bilateral hip and bilateral knee pain.  She received left greater trochanteric steroid injection which provided 75 % relief.  She is very active  and spends multiple hours working on computer.  Patient previously, attempted physical therapy however caused severe muscle spasms and increased pain levels.  Patient reports IV infusion therapy no longer providing significant relief of symptoms or improvement in her quality of life..  We plan to discontinue IV infusion therapy.  Of note patient reports she fell up the stairs the other day causing a contusion to the right lateral thigh and causing increased right shoulder and knee pain.  She did not seek medical treatment she denies any pulling, sharp " burning or ripping sensation  at the time of the fall.  On physical examination she has cervical decreased range of motion with flexion extension lateral bending, chest pain on palpation of bilateral trapezius region and right rhomboid and posterior glenohumeral joint.  Right shoulder full range of motion with internal/external rotation abduction abduction.  Denies numbness and tingling into bilateral upper extremities.  Right hip full range of motion with internal/external rotation abduction adduction flexion extension, right lateral thigh allodynia and hyperparesthesia, related to contusion.  Lower extremity vibratory sensation reflex intact.  Denies bowel or bladder incontinence.  Or saddle paresthesia provided right shoulder x-ray and right hip x-ray for further evaluation.  Will consider right shoulder fluoroscopy guided suprascapular steroid injection pending x-rays.  She continues to take gabapentin and meloxicam for symptom relief.  Encouraged to thaws milligrams of Tylenol every 8 hours for symptom relief.  Encourage patient to apply ice 20 minutes on 20 minutes off to right hip and right shoulder.  Discontinued IV infusion therapy.  She continues to follow-up with orthopedics Dr. Coughlin for bilateral knee symptom management.  Recommend referral to [facility] high-dose infusion clinic for further evaluation.      Plan  At least 50% of the visit was involved in the discussion of the options for treatment. We discussed exercises, medication, interventional therapies and surgery. Healthy life style is essential with patient hard work to achieve the wellness. In addition; discussion with the patient and/or family about any of the diagnostic results, impressions and/or recommended diagnostic studies, prognosis, risks and benefits of treatment options, instructions for treatment and/or follow-up, importance of compliance with chosen treatment options, risk-factor reduction, and patient/family education.      Patient is being treated with opioid therapy for pain and is responding appropriately.  There are NO signs of opioid intoxication, abuse, addiction or withdrawal.  Pupils are equal, reactive to light bilateral, appropriate speech and cognition. Patient denies any opioid induced constipation. The OARRS registry followed periodically, urine toxicology completed and appropriate.     Patient is advised and warned  in specific detail about potential benefits of opioids along with risks and side effects including, but not limited to, dependency, addiction, tolerance, hyperalgesia, anxiety, depression, insomnia, endocrine changes, immunologic disturbances, respiratory depression and death.     Caution advised regarding the use of medications prescribed at this office and specific mention made regarding not to drive or operate heavy machinery if feeling side effects from this the medications. Patient  expressed an understanding in regards to these particular concerns.     Discussed non-opioid options including (But no limited), physical wellness, antiinflammatory diet, icing and heating, meditation, relaxation, massage and acupuncture.    We will continue to monitor and adjust medications as needed.    Provided orders for right shoulder x-ray and right hip x-ray.  Will consider fluoroscopy guided right to prescribe scapular nerve block for right shoulder pain  Continue self-directed physical therapy  Continue with Tylenol and meloxicam for symptom management  Recommended high-dose [facility] IV infusion therapy  Discontinued IV infusion therapy.  Healthy lifestyle and anti-inflammatory diet in addition to weight control discussed with the patient  Alternative chronic pain therapies was discussed, encouraged and information was handed  Return to Clinic once x-rays completed     *Please note this report has been produced using speech recognition software and may contain errors related to that system including  grammar, punctuation and spelling as well as words and phrases that may be inappropriate. If there are questions or concerns, please feel free to contact me to clarify.      I spent 50 minutes in the professional and overall care of this patient.       Marv Mae, SULEMA-CNP

## 2025-01-28 ENCOUNTER — APPOINTMENT (OUTPATIENT)
Dept: PRIMARY CARE | Facility: CLINIC | Age: 60
End: 2025-01-28
Payer: COMMERCIAL

## 2025-02-17 ENCOUNTER — APPOINTMENT (OUTPATIENT)
Dept: INFUSION THERAPY | Facility: CLINIC | Age: 60
End: 2025-02-17
Payer: COMMERCIAL

## 2025-02-26 NOTE — PROGRESS NOTES
Subjective   Patient ID: Natacha High is a 59 y.o. female who presents for her annual physical       Her right knee is painful and still accumulating fluid     She has edema in the left 4 th finger.     She has been on semaglutide for 6 weeks and has not lost weight     She continues to vape.     She has a migraine almost every day.     She has seen Dr Farr for HRT  She tried the suppositories but could not open them. Her  could not open them.  She is too afraid to do HRT.     She tried the toradol, propofol infusions but she did not have much improvement so stopped.   She has done chronic pain management in the past     HEALTH:  PAP 2/15, 2/24- with Dr Wei   Mammo 2/17, 2/19,  ordered 10/2023   Mammo 3/22 focal asymmetry right breast and follow up was negative,  BD 6/22 T-2.2,   Colon 2015, 4/19 hyperplastic polyp, 6/22 and Q 5   Ca cardiac score 2/19 Zero    EKG 12/09, 12/18, 3/22  Urine 12/18   Hep C ordered 2/25   Flu 2012, 10/21, 12/22, 10/23, 11/24 at work  TDAP 2004, 12/18  Prevnar never  Pneumovax 11/19/24  Zostavax never   Shingrix recommended and will check coverage   SARS-CoV-2- 3/21, 4/21, 10/22, 1/22   Opth- She sees regularly for dry eyes/ Sjogren's. No glaucoma, MD or HCQ toxicity   She is using Restasis OU BID and Cequa drops        Review of Systems  All systems negative except those listed in the HPI       Objective   Visit Vitals  /72 (BP Location: Left arm, Patient Position: Sitting, BP Cuff Size: Adult)   Pulse 92    Body mass index is 32.24 kg/m².      Physical Exam  Vitals reviewed.   Constitutional:       Appearance: Normal appearance. She is obese.   HENT:      Head: Normocephalic.      Right Ear: Tympanic membrane, ear canal and external ear normal.      Left Ear: Tympanic membrane, ear canal and external ear normal.      Nose: Nose normal.      Mouth/Throat:      Pharynx: Oropharynx is clear.   Eyes:      Conjunctiva/sclera: Conjunctivae normal.   Cardiovascular:      Rate  and Rhythm: Normal rate and regular rhythm.      Pulses: Normal pulses.      Heart sounds: Normal heart sounds.   Pulmonary:      Effort: Pulmonary effort is normal.      Breath sounds: Normal breath sounds.   Abdominal:      General: Bowel sounds are normal.      Palpations: Abdomen is soft.   Musculoskeletal:         General: Normal range of motion.      Cervical back: Normal range of motion and neck supple.      Comments: right knee effusion, ecchymosis   Skin:     General: Skin is warm.      Comments: Hematoma right leg groin area    Neurological:      General: No focal deficit present.      Mental Status: She is alert and oriented to person, place, and time.   Psychiatric:         Mood and Affect: Mood normal.         Behavior: Behavior normal.         Thought Content: Thought content normal.         Judgment: Judgment normal.       Assessment/Plan        Annual physical completed  Annual labs ordered  Medication reconciliation performed with patient     [facility]  -  Discussed healthy diet and regular exercise.    -  Physical exam overall unremarkable. Immunizations reviewed and updated accordingly. Healthy lifestyle choices discussed (tobacco avoidance, appropriate alcohol use, avoidance of illicit substances).   -  Patient is wearing seatbelt.   -  Screening lab work ordered as indicated.    -  Age appropriate screening tests reviewed with patient.       She is  with 2 children. She is an active tobacco user   They are going to [place] for their 40 th wedding anniversary this year 2025.      She stopped smoking in 1/2023. She is vaping nicotine. Warned patient of vaping and the unknown medical issues caused by the chemicals found in the vapes.   She started smoking at age 15. Her father smoked also.   Nicotine dependence I spent more than 3 minutes counseling patient about the need for smoking cessation and how I can support efforts when patient is ready to quit. Discussed nicotine  replacement therapy, Varenicline, Bupropion, hypnosis, support groups and acupuncture as potential options. Patient currently has no signs or symptoms of tobacco related disease.  Ready to quit: No  Counseling given: Yes 2/25  Recommend 1-800-QUIT-NOW to assist with smoking cessation.     Long discussion about her smoking history. Due to her history of more than 30 pack a year smoking and her being over 55 y/o I recommend CT lung screening for lung cancer. Explained this study may last up to 3 years and may require testing yearly pending results of testing. Risks and benefits of radiation with testing discussed with patient. The patient is in agreement with our discussion today.   CT scan ordered today 2/25  Encouraged cessation      Her weight is 182 pounds with BMI at 32.24. We spent 15 minutes discussing diet and exercise. The struggle of weight loss persists   She has been doing the FODMAP diet for 10 years    She lost 4 pounds with Saxenda.    She has been on semaglutide for 6 weeks and has not lost weight. Warned patient the FDA has banned the compounded semaglutide.   Discussed her systemic medications contributing to her weight gain and inability to lose weight 2/25.      BP in normal range in office. We will continue to monitor   EKG normal 2/25, no LVH or strain pattern noted    Recommend she monitor her BP periodically and call with elevated readings      I have spent 15 min face to face with this patient discussing their cardiac risk   We discussed the patients cardiovascular risk. If needed, lifestyle modifications recommended including: behavioral therapies of nutrition choices, exercise and eliminate habits that are contributing to their cardiac risk. We agreed to a plan to decrease his cardiovascular risks. Discussed ASA. Reviewed Guidelines and approved recommendations made to patient.   The patient's 10 yr CV risk was estimated at  1.7 % IO 2/25     Elevated lipids: Labs ordered and we will adjust  if indicated  2/25  Explained goal for LDL to be less than 100 and ideally less than 70   Continue atorvastatin 10 mg daily   Ca cardiac Score 2/19 Zero   Recommend she look into a plant based/ whole foods diet       Thyroid nodule: Labs ordered and we will adjust if indicated  2/25    Thyroid Bx in 9/2019 benign nodule:   Thyroid Bx 2022 negative   US thyroid 12/24 TI-RADS 1 Nodule, no follow-up or FNA is advised.    She is following with Dr Sousa in endocrinology      Migraines - She has a migraine almost every day.    Migraines started about 20 years ago    Continue naratriptan 2.5 mg prn onset of HA and Ajovy as directed   She saw DEVANTE Jordan in 12/24       Right submandibular gland tenderness radiating to the right ear -   The Sjogren dries the nasal passages a lot and they are raw.   Has been evaluated by allergist with all the left side face issues and nothing found     GERD -   She has a history of HH  Continue Pepcid 40 mg daily   She had no improvement with Dexilant   Avoid Reglan while taking Pristiq   She has been on Carafate and Prilosec in the past   Xray Upper GI 6/2021 showed mild to moderate tertiary contractions in the mid to distal esophagus resulting in moderate gastroesophageal reflux. Delay in passage of contrast stomach duodenum, may represent sequela of gastro paresis     Depression is all from her stressors and pain:   Continue Adderall 15 mg daily and Zoloft 100 mg daily   She is seeing psych and they are doing refills, contracts and drug screen.   Discussed food satiation with SSRIs. She will be mindful of this      OAB:  Previously failed Trospium due to experiencing adverse side effects and both Gemtesa and Myrbetriq were cost-prohibitive.   - Cystoscopy + intradetrusor Botox injection of 100 U @ 4 sites 5/2023. Tolerated the procedure well with normal cystoscopy findings: Normal bladder mucosa. No stones, masses, or tumors seen.   She saw Dr. Fuentes in 5/2023      IBS and  Constipation -    She is doing a FODMAP diet   Continue Linzess 145 mg a day.    Colon 6/2022 and Q 5   Hydrogen breath test 11/24 was normal   Hemorrhoids were removed with Dr Toney nonsurgically.   She saw Dr Metzger in 10/24- recommended she sees endocrinology for hormone evaluation to rule out things such as Cushing syndrome which could be causing her weight gain as well especially that she has central obesity.      LLE pain: Warned patient nicotine is a vasoconstrictor. She needs to quit smoking.   Continue gabapentin 600 mg 2 tablets Q 8 hours    EMG studies 3/2024 were normal   MR L/S 2/2024 There is a transitional lumbosacral vertebral body which is  lumbarized on the right and sacralized on the left and for the sake of this dictation will be labeled L5. The coronal  images demonstrate a mild dextrocurvature of the lumbar spine. There is minimal retrolisthesis of L1 on L2. There is multilevel spondylosis with varying degrees of spinal canal and neural foraminal narrowing   She saw Dr Cintron in 4/2024 : The patient does need a skin biopsy to rule out a small fiber neuropathy.      Seronegative RA and Sjogrens - On exam: tenderness to all the fibro points 7/2024   She restarted MTX in 2022 due to her arthritis being so bad   The main cause of her fatigue, muscle pain, GI issues and insomnia is Fibromyalgia   Coldness and tingling of hands - Worse right hand. This could be nerve impingement.   Continue Imuran 50 mg BID   Continue  mg daily and pilocarpine 7.5 mg daily  Continue gabapentin 600 mg 2 tablets Q 8 hours   She is following with rheumatology at [facility]   Recommend she ask rheumatology if she should continue scripted Vitamin D3, concerns for bone leakage with long term use of higher doses. Her Vitamin D levels in 10/24 was 75.      Cervical disc d/o:  - S/p C spine fusion and some severe nerve pain in her neck and jaw   MRI C/S 5/24 Postsurgical changes of anterior fusion and discectomy at  C3 through C7. No significant spinal canal stenosis. Mild neural foraminal stenosis at C7-T1.   She failed pain management and the EMG do not show any radiculopathy  No surgery for her hands despite the numbness, pain and coldness   some atrophy right thenar region so concerned there is some innervation in the hand      Mild spinal scoliosis, turning to the right- she tried the toradol, propofol infusions but she did not have much improvement so stopped. She has done chronic pain management in the past.   Continue meloxicam 15 mg daily   She sees pain Management and plans to get nerve blocks soon   She has Tramadol but mostly takes it when she has knee pain   MR L/S 5/24 There is a transitional lumbosacral vertebral body which is  lumbarized on the right and sacralized on the left and for the sake of this dictation will be labeled L5. The coronal  images demonstrate a mild dextrocurvature of the  lumbar spine. There is minimal retrolisthesis of L1 on L2. There is multilevel spondylosis with varying degrees of spinal canal and neural foraminal narrowing  - s/p Lt L3-4 lumbar sympathetic block on 5/7/2024  - s/p 11/26/2024 left GT bursa steroid injection    She saw Dr Kilpatrick in 6/2024     She saw pain management in 11/24 She is getting pain infusions every couple of weeks - toradol, propofol and another medication    She saw pain mgmt in 1/25 We plan to discontinue IV infusion since not helping any longer. Recommend referral to [facility] high-dose infusion clinic for further evaluation.       Rotary cuff tear surgery 11/8/17. She still has a lot of pain in the left shoulder.   MR left shoulder 12/24 Postoperative changes from prior rotator cuff tear with no evident re tear. Mild glenohumeral and acromioclavicular joint osteoarthrosis.  This affects the numbness and pain down the left arm when working     Bilateral knee pain: On exam: right knee effusion, ecchymosis 2/25. Her right knee is painful and still  accumulating fluid. Recommend using an OTC antiinflammatory cream on the knee daily   MR Lt knee 9/24 Mild degenerative changes of the proximal tibiofibular joint with small amount of subchondral edema.   She saw FRANCISCO Simon in 10/24 and 2/25 and had steroid injection bilat knees   She saw FRANCISCO Simon in 12/24       Thumb replacement right hand in 3/2022 :   She has swan deformity at base of right thumb at the surgical site   Xray 9/24 maintenance of thumb CMC arthroplasty good alignment good position with good maintenance of r carpal metacarpal space   She saw Dr Pereira in 9/24 Rx given for Medrol, thumb brace follow-up p.r.n.     Bunion on right foot and arthritis outer left foot   Avoid walking barefooted and wear good shoes   She is following with podiatry and last visit 10/2023     She saw Dr Wei in 2/2024 and Pap was normal   Continue vaginal estrogen cream twice weekly   FSH 44.4 on labs in 7/2022  US of pelvis in 3/2020 showed small uterine fibroid, thickened endometrium   Pelvic wall therapy was helpful.   She has seen Dr Farr for HRT. She tried the suppositories but could not open them. Her  could not open them.  She is too afraid to do HRT. Plus she is a smoker and higher risk for clotting issues.     Hair loss:  She has been on minoxidil 1 mg daily for years    Continue dutasteride 0.5 mg daily per dermatology    She failed finasteride and Nutrafol  She is following with dermatology at [facility]     Mammo 3/2022 was focal asymmetry right breast and follow up was negative.  Orders placed for mammogram 10/2023  Breast exam normal 10/2023     BD 6/2022 -2.2 L/S and hip -2.0 at [facility]. Continue OTC Calcium 500 mg BID, OTC Vitamin D3 2000 UT daily and eat 2 servings of calcium enriched foods daily.   Discussed importance of weight bearing exercises   Colonoscopy 6/2022 and Q 5      Ophth  She sees regularly for dry eyes/ Sjogren's. No glaucoma, MD or HCQ toxicity.   She is using Restasis OU BID and Cequa drops    She will have her next eye exam faxed to my office in order to update her medical records.      Hep C ordered 2/25   Flu 2012, 10/21, 12/22, 10/23, 11/24 at work  TDAP 2004, 12/18  Prevnar never  Pneumovax 11/19/24  Zostavax never   Shingrix recommended and will check coverage   SARS-CoV-2- 3/21, 4/21, 10/22, 1/22      Some elements in the chart were copied from Dr. Daiz's last office visit with patient.   Notes have been updated where appropriate, and reflect my current medical decision making from today.      Return in 6 months for follow up or sooner if needed  (CPE due 2/26)       Scribe Attestation  By signing my name below, I, Kimberly Eastman , Scribe   attest that this documentation has been prepared under the direction and in the presence of Mariana Diaz MD

## 2025-02-27 ENCOUNTER — APPOINTMENT (OUTPATIENT)
Dept: PRIMARY CARE | Facility: CLINIC | Age: 60
End: 2025-02-27
Payer: COMMERCIAL

## 2025-02-27 VITALS
OXYGEN SATURATION: 99 % | WEIGHT: 182 LBS | HEIGHT: 63 IN | BODY MASS INDEX: 32.25 KG/M2 | DIASTOLIC BLOOD PRESSURE: 72 MMHG | SYSTOLIC BLOOD PRESSURE: 120 MMHG | HEART RATE: 92 BPM

## 2025-02-27 DIAGNOSIS — N32.81 OAB (OVERACTIVE BLADDER): ICD-10-CM

## 2025-02-27 DIAGNOSIS — M25.461 EFFUSION, RIGHT KNEE: ICD-10-CM

## 2025-02-27 DIAGNOSIS — G62.89 OTHER POLYNEUROPATHY: ICD-10-CM

## 2025-02-27 DIAGNOSIS — F17.290 OTHER TOBACCO PRODUCT NICOTINE DEPENDENCE, UNCOMPLICATED: ICD-10-CM

## 2025-02-27 DIAGNOSIS — N95.1 MENOPAUSAL SYMPTOMS: ICD-10-CM

## 2025-02-27 DIAGNOSIS — Z00.00 HEALTHCARE MAINTENANCE: Primary | ICD-10-CM

## 2025-02-27 DIAGNOSIS — F17.200 CURRENT SMOKER: ICD-10-CM

## 2025-02-27 DIAGNOSIS — M79.7 FIBROMYALGIA: ICD-10-CM

## 2025-02-27 DIAGNOSIS — M48.07 SPINAL STENOSIS OF LUMBOSACRAL REGION: ICD-10-CM

## 2025-02-27 DIAGNOSIS — L65.9 HAIR LOSS DISORDER: ICD-10-CM

## 2025-02-27 DIAGNOSIS — M06.09 RHEUMATOID ARTHRITIS OF MULTIPLE SITES WITH NEGATIVE RHEUMATOID FACTOR (MULTI): ICD-10-CM

## 2025-02-27 DIAGNOSIS — G43.E19 INTRACTABLE CHRONIC MIGRAINE WITH AURA AND WITHOUT STATUS MIGRAINOSUS: ICD-10-CM

## 2025-02-27 DIAGNOSIS — M35.00 SJOGREN'S SYNDROME, WITH UNSPECIFIED ORGAN INVOLVEMENT (MULTI): ICD-10-CM

## 2025-02-27 DIAGNOSIS — Z11.59 ENCOUNTER FOR HEPATITIS C SCREENING TEST FOR LOW RISK PATIENT: ICD-10-CM

## 2025-02-27 DIAGNOSIS — K58.1 IRRITABLE BOWEL SYNDROME WITH CONSTIPATION: ICD-10-CM

## 2025-02-27 DIAGNOSIS — M81.0 AGE-RELATED OSTEOPOROSIS WITHOUT CURRENT PATHOLOGICAL FRACTURE: ICD-10-CM

## 2025-02-27 DIAGNOSIS — K31.84 GASTROPARESIS: ICD-10-CM

## 2025-02-27 DIAGNOSIS — G47.33 OBSTRUCTIVE SLEEP APNEA SYNDROME: ICD-10-CM

## 2025-02-27 PROBLEM — M17.0 PRIMARY OSTEOARTHRITIS OF BOTH KNEES: Status: ACTIVE | Noted: 2025-02-20

## 2025-02-27 PROCEDURE — 99396 PREV VISIT EST AGE 40-64: CPT | Performed by: INTERNAL MEDICINE

## 2025-02-27 PROCEDURE — 1036F TOBACCO NON-USER: CPT | Performed by: INTERNAL MEDICINE

## 2025-02-27 PROCEDURE — 3008F BODY MASS INDEX DOCD: CPT | Performed by: INTERNAL MEDICINE

## 2025-02-27 PROCEDURE — 93000 ELECTROCARDIOGRAM COMPLETE: CPT | Performed by: INTERNAL MEDICINE

## 2025-02-27 RX ORDER — MINOXIDIL 2.5 MG/1
2.5 TABLET ORAL DAILY
Qty: 30 TABLET | Refills: 11 | Status: SHIPPED | OUTPATIENT
Start: 2025-02-27 | End: 2026-02-27

## 2025-02-27 RX ORDER — DUTASTERIDE 0.5 MG/1
0.5 CAPSULE, LIQUID FILLED ORAL DAILY
Qty: 30 CAPSULE | Refills: 11 | Status: SHIPPED | OUTPATIENT
Start: 2025-02-27 | End: 2026-02-27

## 2025-02-27 NOTE — PATIENT INSTRUCTIONS
Current weight: 82.6 kg (182 lb)  Weight change since last visit (-) denotes wt gain2 lbs   Weight loss needed to achieve BMI 25: 41.2 Lbs  Weight loss needed to achieve BMI 30: 13 Lbs    It was a pleasure to see you today.  I would like to remind you about importance of a healthy lifestyle in order to improve your well-being and live longer. Try to engage in physical activities for at least 150 minutes per week.  Eat about 10 servings of fruits and vegetables daily. My advice is 2 servings of fruits and 8 servings of vegetables. For vegetables choose at least half of them green and at least half of them fresh.  Please avoid sugar, salt, fried food and saturated fat.  Weight loss is advised. Target BMI: below 25. Please follow low carbohydrate diet and daily exercise routine for at least 30 minutes. Nutritional consultation is available, please let me know if you are interested. I will be happy to discuss details with you if interested.   Have a good day and stay well.      The ability to age comfortably depends on how you invest in your body.   Include physical activity in your daily routine. Physical activity increases blood flow to your whole body, including your brain. ...  Eat a healthy diet. A heart-healthy diet may benefit your brain.   Stay mentally active. Be social.   Treat cardiovascular disease.  No smoking, excessive EtOH intake or illicit drug use.

## 2025-03-01 DIAGNOSIS — L29.9 ITCHING: ICD-10-CM

## 2025-03-04 RX ORDER — HYDROXYZINE HYDROCHLORIDE 10 MG/1
TABLET, FILM COATED ORAL
Qty: 360 TABLET | Refills: 1 | Status: SHIPPED | OUTPATIENT
Start: 2025-03-04

## 2025-04-01 ENCOUNTER — HOSPITAL ENCOUNTER (OUTPATIENT)
Dept: RADIOLOGY | Facility: CLINIC | Age: 60
Discharge: HOME | End: 2025-04-01
Payer: COMMERCIAL

## 2025-04-01 DIAGNOSIS — F17.200 CURRENT SMOKER: ICD-10-CM

## 2025-04-01 DIAGNOSIS — F17.290 OTHER TOBACCO PRODUCT NICOTINE DEPENDENCE, UNCOMPLICATED: ICD-10-CM

## 2025-04-01 PROCEDURE — 71271 CT THORAX LUNG CANCER SCR C-: CPT | Performed by: RADIOLOGY

## 2025-04-01 PROCEDURE — 71271 CT THORAX LUNG CANCER SCR C-: CPT

## 2025-04-02 LAB
ALBUMIN SERPL-MCNC: 4.4 G/DL (ref 3.6–5.1)
ALP SERPL-CCNC: 68 U/L (ref 37–153)
ALT SERPL-CCNC: 11 U/L (ref 6–29)
ANION GAP SERPL CALCULATED.4IONS-SCNC: 7 MMOL/L (CALC) (ref 7–17)
AST SERPL-CCNC: 16 U/L (ref 10–35)
BILIRUB SERPL-MCNC: 0.4 MG/DL (ref 0.2–1.2)
BUN SERPL-MCNC: 11 MG/DL (ref 7–25)
CALCIUM SERPL-MCNC: 9 MG/DL (ref 8.6–10.4)
CHLORIDE SERPL-SCNC: 105 MMOL/L (ref 98–110)
CO2 SERPL-SCNC: 26 MMOL/L (ref 20–32)
CREAT SERPL-MCNC: 0.55 MG/DL (ref 0.5–1.03)
EGFRCR SERPLBLD CKD-EPI 2021: 106 ML/MIN/1.73M2
ERYTHROCYTE [DISTWIDTH] IN BLOOD BY AUTOMATED COUNT: 13.1 % (ref 11–15)
EST. AVERAGE GLUCOSE BLD GHB EST-MCNC: 114 MG/DL
EST. AVERAGE GLUCOSE BLD GHB EST-SCNC: 6.3 MMOL/L
GLUCOSE SERPL-MCNC: 77 MG/DL (ref 65–99)
HBA1C MFR BLD: 5.6 % OF TOTAL HGB
HCT VFR BLD AUTO: 36.5 % (ref 35–45)
HCV AB SERPL QL IA: NORMAL
HGB BLD-MCNC: 12.3 G/DL (ref 11.7–15.5)
MCH RBC QN AUTO: 31.4 PG (ref 27–33)
MCHC RBC AUTO-ENTMCNC: 33.7 G/DL (ref 32–36)
MCV RBC AUTO: 93.1 FL (ref 80–100)
PLATELET # BLD AUTO: 374 THOUSAND/UL (ref 140–400)
PMV BLD REES-ECKER: 9.9 FL (ref 7.5–12.5)
POTASSIUM SERPL-SCNC: 4.3 MMOL/L (ref 3.5–5.3)
PROT SERPL-MCNC: 6.6 G/DL (ref 6.1–8.1)
RBC # BLD AUTO: 3.92 MILLION/UL (ref 3.8–5.1)
SODIUM SERPL-SCNC: 138 MMOL/L (ref 135–146)
TSH SERPL-ACNC: 1.21 MIU/L (ref 0.4–4.5)
WBC # BLD AUTO: 6.3 THOUSAND/UL (ref 3.8–10.8)

## 2025-04-02 NOTE — RESULT ENCOUNTER NOTE
Abdulkadir Ribeiro-  The lung nodules are not significant size and will continue with yearly follow up   The cardiac score is 23 so very little plague deposition in the coronaries

## 2025-04-07 ENCOUNTER — TELEPHONE (OUTPATIENT)
Dept: PRIMARY CARE | Facility: CLINIC | Age: 60
End: 2025-04-07
Payer: COMMERCIAL

## 2025-04-07 NOTE — TELEPHONE ENCOUNTER
Patient called in to state that she had blood work drawn and Rheumatologist also requested some labs done, and patient would like most recent labs faxed to Rheumatologist to Dr. Pagan/FAX: [phone].

## 2025-04-08 ENCOUNTER — TELEPHONE (OUTPATIENT)
Dept: PRIMARY CARE | Facility: CLINIC | Age: 60
End: 2025-04-08
Payer: COMMERCIAL

## 2025-04-08 NOTE — TELEPHONE ENCOUNTER
[facility] called for pt results for latest EKG. Please advise     Fax: [phone]  Call Back: [phone]

## 2025-04-30 DIAGNOSIS — Z72.0 TOBACCO USE: Primary | ICD-10-CM

## 2025-07-01 ENCOUNTER — APPOINTMENT (OUTPATIENT)
Facility: CLINIC | Age: 60
End: 2025-07-01
Payer: COMMERCIAL

## 2025-07-01 DIAGNOSIS — M70.62 TROCHANTERIC BURSITIS OF LEFT HIP: Primary | ICD-10-CM

## 2025-07-01 DIAGNOSIS — M25.552 LEFT HIP PAIN: ICD-10-CM

## 2025-07-01 PROCEDURE — 99204 OFFICE O/P NEW MOD 45 MIN: CPT | Performed by: PAIN MEDICINE

## 2025-07-01 PROCEDURE — 1036F TOBACCO NON-USER: CPT | Performed by: PAIN MEDICINE

## 2025-07-01 ASSESSMENT — PAIN SCALES - GENERAL: PAINLEVEL_OUTOF10: 6

## 2025-07-01 ASSESSMENT — PAIN - FUNCTIONAL ASSESSMENT: PAIN_FUNCTIONAL_ASSESSMENT: 0-10

## 2025-07-01 NOTE — PROGRESS NOTES
"Subjective   HPI: Gema High \"Natacha\" is a 59 y.o. female with a past medical history of RA, Sjogren syndrome, , depression, fibromyalgia, irritable syndrome, gastroparesis, and left hip OA who presented today with worsening of her hip pain with movement and rest. She used to get a good relief with stereoid injections in the hip in the past. She is interested today for injection.          Physical Therapy: The patient has not done physical therapy within the past six months  Other Conservative Measures she has tried: Injections  Classes of medications tried in the past: Acetaminophen and NSAIDs      Review of Systems   13-point ROS done and negative except for HPI.     Current Outpatient Medications   Medication Instructions    Ajovy Autoinjector 225 mg/1.5 mL auto-injector Ajovy 225 MG/1.5ML Subcutaneous Solution Auto-injector  Quantity: 2   Refills: 0  Start: 25-Mar-2021    alpha lipoic acid 600 mg capsule 1 p.o. after meal 3 times daily x 1 week increase to 2 p.o. 3 times daily    amphetamine-dextroamphetamine (Adderall) 15 mg tablet Amphetamine-Dextroamphetamine 15 MG Oral Tablet  Quantity: 30   Refills: 0  Start: 5-Apr-2023    atorvastatin (LIPITOR) 10 mg, oral, Daily    azaTHIOprine (IMURAN) 50 mg, 2 times daily    azelastine (Astelin) 137 mcg (0.1 %) nasal spray Azelastine HCl - 0.1 % Nasal Solution  Quantity: 30   Refills: 0  Start: 1-Oct-2020    dutasteride (AVODART) 0.5 mg, oral, Daily    ergocalciferol (Vitamin D-2) 1.25 MG (54682 UT) capsule Vitamin D (Ergocalciferol) 1.25 MG (24005 UT) Oral Capsule  Quantity: 4   Refills: 0  Start: 22-Sep-2020    fluticasone (Flonase) 50 mcg/actuation nasal spray 2 sprays, Daily    gabapentin (NEURONTIN) 600 mg, Every 8 hours    hydroxychloroquine (Plaquenil) 200 mg tablet 1 tablet, Daily    hydrOXYzine HCL (Atarax) 10 mg tablet TAKE 1 TABLET BY MOUTH EVERY 6 HOURS IF NEEDED FOR ITCHING.    ketoconazole (NIZOral) 2 % shampoo Ketoconazole 2 % External Shampoo  Quantity: " 120   Refills: 0  Start: 10-Dec-2020    Linzess 290 mcg, Daily    meloxicam (MOBIC) 7.5 mg, Daily    Miebo 100 % drops INSTILL 1 DROP INTO AFFECTED EYE(S) FOUR TIMES DAILY    minoxidil (LONITEN) 2.5 mg, oral, Daily    naratriptan (AMERGE) 2.5 mg    NON FORMULARY coumpound Gabapentin and Lidocaine    Pepcid 40 mg tablet 1 tablet, 2 times daily    pilocarpine (SALAGEN) 7.5 mg, 4 times daily    sertraline (Zoloft) 100 mg tablet TAKE 1 TABLET BY MOUTH EVERY DAY IN THE MORNING AS DIRECTED       Medical History[1]     Surgical History[2]     Family History[3]     RX Allergies[4]     Objective     There were no vitals filed for this visit.     Physical Exam  General: NAD, well groomed, well nourished  Eyes: Non-icteric sclera, EOMI  Ears, Nose, Mouth, and Throat: External ears and nose appear to be without deformity or rash. No lesions or masses noted. Hearing is grossly intact.   Neck: Trachea midline  Respiratory: Nonlabored breathing   Cardiovascular: no peripheral edema   Skin: No rashes or open lesions/ulcers identified on skin.  MSK: normal muscle tone and bulk    Extremity: no LE edema     Hip: Pain over left greater trochanter., Pain reproduced with internal/external rotation., and on the left    Neurologic:   Cranial nerves grossly intact.   Strength 5/5 and symmetric plantar/dorsiflexion   Sensation: Normal to light touch throughout, and to pinprick intact throughout.  DTRs:normal and symmetric throughout  Nguyen: absent  Clonus: absent    Psychiatric: Alert, orientation to person, place, and time. Cooperative.    Imaging personally reviewed and independently interpreted:     Assessment/Plan   Lt Trochanteric bursitis  Lt Hip OA    Her pain is likely mixed of both Trochanteric bursitis and hip OA, we did local injecttion of her trochanteric bursitis with local anesthetic and upon reassessing the patient later on, she left the clinic.     Plan:  - Hip Xray     Procedure done: after verbal consent , complete  sterile tech.Local anesthetic Injection of left the trochanteric bursitis 4 ml of marcaine 0.75% injected   The patient has failed treatment with : Physical therapy , ketamine infusion, and have significant limitations of their quality of life due to the pain    We discussed  the risks, benefits and alternatives of the procedure including but not limited to: , Lack of efficacy , Transiently worsening pain , Bleeding, Infection , and Nerve Damage    Follow up: After X-rays    The patient was invited to contact us back anytime with any questions or concerns and follow-up with us in the office as needed.     There are no diagnoses linked to this encounter.    This note was generated with the aid of dictation software, there may be typos despite my attempts at proofreading.          [1]   Past Medical History:  Diagnosis Date    Acute pharyngitis, unspecified 10/21/2014    Sore throat    Anxiety disorder, unspecified     Anxiety and depression    Chronic pain disorder 1996    Chronic rhinitis 09/08/2015    Rhinitis    CTS (carpal tunnel syndrome) 2022    Fibrocystic breast 1989    Fibroid Recurring    Fibromyalgia, primary 1996    Furuncle of buttock 09/06/2017    Boil, buttock    Joint pain 1996    Low back pain     Major depressive disorder, recurrent, unspecified     Major depression, recurrent    Migraine 2008    Neck pain 2000    Osteoarthritis 2015    Other malaise 07/09/2020    Malaise and fatigue    Personal history of diseases of the skin and subcutaneous tissue     History of psoriasis    Personal history of other diseases of the digestive system 11/24/2014    History of rectal bleeding    Personal history of other diseases of the musculoskeletal system and connective tissue     History of arthritis    Personal history of other diseases of the respiratory system 12/06/2021    History of acute bronchitis with bronchospasm    Pneumonia, unspecified organism 10/06/2022    Pneumonia of right lung due to infectious  organism, unspecified part of lung    Rheumatoid arthritis 1997   [2]   Past Surgical History:  Procedure Laterality Date    BREAST SURGERY  01/04/2013    Breast Surgery Reduction Procedure Bilateral    COLONOSCOPY W/ POLYPECTOMY  01/04/2013    Complete Colonoscopy For Polyp Removal    EPIDURAL BLOCK INJECTION  2008    ESOPHAGOGASTRODUODENOSCOPY  01/04/2013    Diagnostic Esophagogastroduodenoscopy    HYSTEROSCOPY  02/19/2015    Hysteroscopy With Endometrial Ablation    JOINT REPLACEMENT  2023    NECK SURGERY  2011    ORTHOPEDIC SURGERY  2023    OTHER SURGICAL HISTORY  01/04/2013    Hysteroscopy    OTHER SURGICAL HISTORY  01/04/2013    Spinal Arthrodesis For Deformity By Anterior Approach    OTHER SURGICAL HISTORY  01/04/2013    Fusion / Refusion Of Vertebrae    REDUCTION MAMMAPLASTY      TRIGGER POINT INJECTION      TUBAL LIGATION  01/04/2013    Tubal Ligation   [3]   Family History  Problem Relation Name Age of Onset    Diabetes Mother Shirlie Juan Diego     Epilepsy Mother Shirligm Nesbittel         and recurrent seizures    Hypertension Mother Shirlie Juan Diego         essential htn    Other (Bronchitis) Mother Shirlie Juan Diego     Pancreatic cancer Mother Shirlie Juan Diego     Arthritis Mother Shirligm Juan Diego     Diabetes Father Mahmud Bakr     Other (cardiac disorder) Father Mahmud Bakr     Other (stroke syndrome) Father Mahmud Bakr     Other (bronchitis) Daughter      Breast cancer Father's Sister  50 - 59    Cancer Mother Shirlie Juan Diego     Dementia Mother Shirlie Juan Diego     COPD Father Mahmud Bakr     Heart disease Father Mahmud Bakr     Colon polyps Daughter Dorys W    [4]   Allergies  Allergen Reactions    Adalimumab Other     Dry skin    Duloxetine Other     Depression    Escitalopram Oxalate Other     Vertigo    Esomeprazole Unknown    Fluoxetine Other     Could not wake up out of sleeping     Omeprazole Unknown    Pantoprazole Unknown    Topiramate Other     Depression    Venlafaxine Other     Rectal spasm    Wellbutrin  [Bupropion Hcl] Agitation

## 2025-07-03 ENCOUNTER — HOSPITAL ENCOUNTER (OUTPATIENT)
Dept: RADIOLOGY | Facility: HOSPITAL | Age: 60
Discharge: HOME | End: 2025-07-03
Payer: COMMERCIAL

## 2025-07-03 DIAGNOSIS — M25.552 LEFT HIP PAIN: ICD-10-CM

## 2025-07-03 PROCEDURE — 73502 X-RAY EXAM HIP UNI 2-3 VIEWS: CPT | Mod: LEFT SIDE | Performed by: RADIOLOGY

## 2025-07-03 PROCEDURE — 73502 X-RAY EXAM HIP UNI 2-3 VIEWS: CPT | Mod: LT

## 2025-08-04 DIAGNOSIS — E78.5 HYPERLIPIDEMIA, UNSPECIFIED HYPERLIPIDEMIA TYPE: ICD-10-CM

## 2025-08-04 RX ORDER — ATORVASTATIN CALCIUM 10 MG/1
10 TABLET, FILM COATED ORAL DAILY
Qty: 90 TABLET | Refills: 4 | Status: SHIPPED | OUTPATIENT
Start: 2025-08-04

## 2025-08-21 ENCOUNTER — TELEMEDICINE (OUTPATIENT)
Dept: PRIMARY CARE | Facility: CLINIC | Age: 60
End: 2025-08-21
Payer: COMMERCIAL

## 2025-08-21 VITALS — BODY MASS INDEX: 30.11 KG/M2 | WEIGHT: 170 LBS

## 2025-08-21 DIAGNOSIS — J06.9 VIRAL UPPER RESPIRATORY TRACT INFECTION: Primary | ICD-10-CM

## 2025-08-21 ASSESSMENT — ENCOUNTER SYMPTOMS
CHILLS: 1
COUGH: 1
FEVER: 1
HEADACHES: 1
SHORTNESS OF BREATH: 1

## 2025-08-24 DIAGNOSIS — L29.9 ITCHING: ICD-10-CM

## 2025-08-25 RX ORDER — HYDROXYZINE HYDROCHLORIDE 10 MG/1
TABLET, FILM COATED ORAL
Qty: 360 TABLET | Refills: 1 | Status: SHIPPED | OUTPATIENT
Start: 2025-08-25